# Patient Record
Sex: FEMALE | Race: WHITE | Employment: UNEMPLOYED | URBAN - NONMETROPOLITAN AREA
[De-identification: names, ages, dates, MRNs, and addresses within clinical notes are randomized per-mention and may not be internally consistent; named-entity substitution may affect disease eponyms.]

---

## 2017-03-27 ENCOUNTER — HISTORY (OUTPATIENT)
Dept: EMERGENCY MEDICINE | Facility: OTHER | Age: 62
End: 2017-03-27

## 2017-03-27 ENCOUNTER — AMBULATORY - GICH (OUTPATIENT)
Dept: SCHEDULING | Facility: OTHER | Age: 62
End: 2017-03-27

## 2017-03-29 ENCOUNTER — AMBULATORY - GICH (OUTPATIENT)
Dept: SCHEDULING | Facility: OTHER | Age: 62
End: 2017-03-29

## 2017-04-05 ENCOUNTER — COMMUNICATION - GICH (OUTPATIENT)
Dept: FAMILY MEDICINE | Facility: OTHER | Age: 62
End: 2017-04-05

## 2017-04-06 ENCOUNTER — OFFICE VISIT - GICH (OUTPATIENT)
Dept: FAMILY MEDICINE | Facility: OTHER | Age: 62
End: 2017-04-06

## 2017-04-06 ENCOUNTER — HISTORY (OUTPATIENT)
Dept: FAMILY MEDICINE | Facility: OTHER | Age: 62
End: 2017-04-06

## 2017-04-06 DIAGNOSIS — R07.9 CHEST PAIN: ICD-10-CM

## 2017-04-11 ENCOUNTER — AMBULATORY - GICH (OUTPATIENT)
Dept: RADIOLOGY | Facility: OTHER | Age: 62
End: 2017-04-11

## 2017-04-11 DIAGNOSIS — R94.6 ABNORMAL RESULTS OF THYROID FUNCTION STUDIES: ICD-10-CM

## 2017-04-18 ENCOUNTER — HOSPITAL ENCOUNTER (OUTPATIENT)
Dept: RADIOLOGY | Facility: OTHER | Age: 62
End: 2017-04-18

## 2017-04-18 DIAGNOSIS — R94.6 ABNORMAL RESULTS OF THYROID FUNCTION STUDIES: ICD-10-CM

## 2017-05-25 ENCOUNTER — AMBULATORY - GICH (OUTPATIENT)
Dept: RADIOLOGY | Facility: OTHER | Age: 62
End: 2017-05-25

## 2017-05-25 DIAGNOSIS — E04.2 NONTOXIC MULTINODULAR GOITER: ICD-10-CM

## 2017-05-25 DIAGNOSIS — E05.90 THYROTOXICOSIS WITHOUT THYROID STORM: ICD-10-CM

## 2017-05-30 ENCOUNTER — HOSPITAL ENCOUNTER (OUTPATIENT)
Dept: RADIOLOGY | Facility: OTHER | Age: 62
End: 2017-05-30

## 2017-05-30 DIAGNOSIS — E04.2 NONTOXIC MULTINODULAR GOITER: ICD-10-CM

## 2017-05-30 DIAGNOSIS — E05.90 THYROTOXICOSIS WITHOUT THYROID STORM: ICD-10-CM

## 2017-05-31 ENCOUNTER — HOSPITAL ENCOUNTER (OUTPATIENT)
Dept: RADIOLOGY | Facility: OTHER | Age: 62
End: 2017-05-31

## 2017-06-13 ENCOUNTER — HISTORY (OUTPATIENT)
Dept: EMERGENCY MEDICINE | Facility: OTHER | Age: 62
End: 2017-06-13

## 2017-06-13 ENCOUNTER — COMMUNICATION - GICH (OUTPATIENT)
Dept: FAMILY MEDICINE | Facility: OTHER | Age: 62
End: 2017-06-13

## 2017-06-21 ENCOUNTER — HISTORY (OUTPATIENT)
Dept: FAMILY MEDICINE | Facility: OTHER | Age: 62
End: 2017-06-21

## 2017-06-21 ENCOUNTER — OFFICE VISIT - GICH (OUTPATIENT)
Dept: FAMILY MEDICINE | Facility: OTHER | Age: 62
End: 2017-06-21

## 2017-06-21 DIAGNOSIS — R26.89 OTHER ABNORMALITIES OF GAIT AND MOBILITY: ICD-10-CM

## 2017-06-21 DIAGNOSIS — I27.29 OTHER SECONDARY PULMONARY HYPERTENSION (H): ICD-10-CM

## 2017-06-21 DIAGNOSIS — I34.0 NONRHEUMATIC MITRAL VALVE INSUFFICIENCY: ICD-10-CM

## 2017-06-21 ASSESSMENT — PATIENT HEALTH QUESTIONNAIRE - PHQ9: SUM OF ALL RESPONSES TO PHQ QUESTIONS 1-9: 0

## 2017-07-07 ENCOUNTER — COMMUNICATION - GICH (OUTPATIENT)
Dept: FAMILY MEDICINE | Facility: OTHER | Age: 62
End: 2017-07-07

## 2017-07-07 DIAGNOSIS — I10 ESSENTIAL (PRIMARY) HYPERTENSION: ICD-10-CM

## 2017-07-25 ENCOUNTER — MEDICAL CORRESPONDENCE (OUTPATIENT)
Dept: CARDIOLOGY | Facility: CLINIC | Age: 62
End: 2017-07-25
Payer: COMMERCIAL

## 2017-07-25 ENCOUNTER — TRANSFERRED RECORDS (OUTPATIENT)
Dept: HEALTH INFORMATION MANAGEMENT | Facility: CLINIC | Age: 62
End: 2017-07-25

## 2017-07-25 ENCOUNTER — HISTORY (OUTPATIENT)
Dept: CARDIOLOGY | Facility: OTHER | Age: 62
End: 2017-07-25

## 2017-07-25 ENCOUNTER — OFFICE VISIT - GICH (OUTPATIENT)
Dept: CARDIOLOGY | Facility: OTHER | Age: 62
End: 2017-07-25

## 2017-07-25 DIAGNOSIS — G47.33 OBSTRUCTIVE SLEEP APNEA: ICD-10-CM

## 2017-07-25 DIAGNOSIS — I50.32 CHRONIC DIASTOLIC HEART FAILURE (H): ICD-10-CM

## 2017-07-25 DIAGNOSIS — I34.0 NONRHEUMATIC MITRAL VALVE INSUFFICIENCY: ICD-10-CM

## 2017-07-25 DIAGNOSIS — I48.0 PAROXYSMAL ATRIAL FIBRILLATION (H): ICD-10-CM

## 2017-07-25 DIAGNOSIS — E78.00 PURE HYPERCHOLESTEROLEMIA: ICD-10-CM

## 2017-07-25 DIAGNOSIS — I27.29 OTHER SECONDARY PULMONARY HYPERTENSION (H): ICD-10-CM

## 2017-07-25 PROCEDURE — 99204 OFFICE O/P NEW MOD 45 MIN: CPT | Mod: ZP | Performed by: NURSE PRACTITIONER

## 2017-07-26 ENCOUNTER — COMMUNICATION - GICH (OUTPATIENT)
Dept: CARDIOLOGY | Facility: OTHER | Age: 62
End: 2017-07-26

## 2017-07-26 DIAGNOSIS — E78.5 HYPERLIPIDEMIA: ICD-10-CM

## 2017-07-27 ENCOUNTER — COMMUNICATION - GICH (OUTPATIENT)
Dept: CARDIOLOGY | Facility: OTHER | Age: 62
End: 2017-07-27

## 2017-07-27 DIAGNOSIS — I50.30 DIASTOLIC CONGESTIVE HEART FAILURE (H): ICD-10-CM

## 2017-07-27 DIAGNOSIS — I27.29 OTHER SECONDARY PULMONARY HYPERTENSION (H): ICD-10-CM

## 2017-08-08 ENCOUNTER — AMBULATORY - GICH (OUTPATIENT)
Dept: CARDIOLOGY | Facility: OTHER | Age: 62
End: 2017-08-08

## 2017-08-08 ENCOUNTER — AMBULATORY - GICH (OUTPATIENT)
Dept: LAB | Facility: OTHER | Age: 62
End: 2017-08-08

## 2017-08-08 DIAGNOSIS — E78.00 PURE HYPERCHOLESTEROLEMIA: ICD-10-CM

## 2017-08-08 LAB
CHOL/HDL RATIO - HISTORICAL: 3.25
CHOLESTEROL TOTAL: 208 MG/DL
HDLC SERPL-MCNC: 64 MG/DL (ref 23–92)
LDLC SERPL CALC-MCNC: 107 MG/DL
NON-HDL CHOLESTEROL - HISTORICAL: 144 MG/DL
PATIENT STATUS - HISTORICAL: ABNORMAL
TRIGL SERPL-MCNC: 183 MG/DL

## 2017-08-10 ENCOUNTER — COMMUNICATION - GICH (OUTPATIENT)
Dept: FAMILY MEDICINE | Facility: OTHER | Age: 62
End: 2017-08-10

## 2017-08-10 ENCOUNTER — AMBULATORY - GICH (OUTPATIENT)
Dept: SCHEDULING | Facility: OTHER | Age: 62
End: 2017-08-10

## 2017-08-22 ENCOUNTER — COMMUNICATION - GICH (OUTPATIENT)
Dept: FAMILY MEDICINE | Facility: OTHER | Age: 62
End: 2017-08-22

## 2017-08-22 DIAGNOSIS — I27.29 OTHER SECONDARY PULMONARY HYPERTENSION (H): ICD-10-CM

## 2017-08-25 ENCOUNTER — COMMUNICATION - GICH (OUTPATIENT)
Dept: FAMILY MEDICINE | Facility: OTHER | Age: 62
End: 2017-08-25

## 2017-08-25 ENCOUNTER — AMBULATORY - GICH (OUTPATIENT)
Dept: SCHEDULING | Facility: OTHER | Age: 62
End: 2017-08-25

## 2017-08-29 ENCOUNTER — MEDICAL CORRESPONDENCE (OUTPATIENT)
Dept: CARDIOLOGY | Facility: CLINIC | Age: 62
End: 2017-08-29
Payer: COMMERCIAL

## 2017-08-29 ENCOUNTER — HOSPITAL ENCOUNTER (OUTPATIENT)
Dept: RADIOLOGY | Facility: OTHER | Age: 62
End: 2017-08-29
Attending: NURSE PRACTITIONER

## 2017-08-29 ENCOUNTER — OFFICE VISIT - GICH (OUTPATIENT)
Dept: FAMILY MEDICINE | Facility: OTHER | Age: 62
End: 2017-08-29

## 2017-08-29 ENCOUNTER — HISTORY (OUTPATIENT)
Dept: FAMILY MEDICINE | Facility: OTHER | Age: 62
End: 2017-08-29

## 2017-08-29 ENCOUNTER — HISTORY (OUTPATIENT)
Dept: CARDIOLOGY | Facility: OTHER | Age: 62
End: 2017-08-29

## 2017-08-29 ENCOUNTER — OFFICE VISIT - GICH (OUTPATIENT)
Dept: CARDIOLOGY | Facility: OTHER | Age: 62
End: 2017-08-29

## 2017-08-29 DIAGNOSIS — I50.32 CHRONIC DIASTOLIC HEART FAILURE (H): ICD-10-CM

## 2017-08-29 DIAGNOSIS — I48.0 PAROXYSMAL ATRIAL FIBRILLATION (H): ICD-10-CM

## 2017-08-29 DIAGNOSIS — I27.29 OTHER SECONDARY PULMONARY HYPERTENSION (H): ICD-10-CM

## 2017-08-29 DIAGNOSIS — S93.509A: ICD-10-CM

## 2017-08-29 DIAGNOSIS — I10 ESSENTIAL (PRIMARY) HYPERTENSION: ICD-10-CM

## 2017-08-29 DIAGNOSIS — I34.0 NONRHEUMATIC MITRAL VALVE INSUFFICIENCY: ICD-10-CM

## 2017-08-29 DIAGNOSIS — R52 PAIN: ICD-10-CM

## 2017-08-29 PROCEDURE — 99214 OFFICE O/P EST MOD 30 MIN: CPT | Mod: ZP | Performed by: NURSE PRACTITIONER

## 2017-09-01 ENCOUNTER — OFFICE VISIT - GICH (OUTPATIENT)
Dept: FAMILY MEDICINE | Facility: OTHER | Age: 62
End: 2017-09-01

## 2017-09-01 ENCOUNTER — HISTORY (OUTPATIENT)
Dept: FAMILY MEDICINE | Facility: OTHER | Age: 62
End: 2017-09-01

## 2017-09-01 DIAGNOSIS — K80.10 CALCULUS OF GALLBLADDER WITH CHRONIC CHOLECYSTITIS WITHOUT OBSTRUCTION: ICD-10-CM

## 2017-09-01 ASSESSMENT — PATIENT HEALTH QUESTIONNAIRE - PHQ9: SUM OF ALL RESPONSES TO PHQ QUESTIONS 1-9: 0

## 2017-09-05 ENCOUNTER — HISTORY (OUTPATIENT)
Dept: SURGERY | Facility: OTHER | Age: 62
End: 2017-09-05

## 2017-09-05 ENCOUNTER — OFFICE VISIT - GICH (OUTPATIENT)
Dept: SURGERY | Facility: OTHER | Age: 62
End: 2017-09-05

## 2017-09-05 DIAGNOSIS — K80.10 CALCULUS OF GALLBLADDER WITH CHRONIC CHOLECYSTITIS WITHOUT OBSTRUCTION: ICD-10-CM

## 2017-09-05 LAB
A/G RATIO - HISTORICAL: 1.5 (ref 1–2)
ABSOLUTE BASOPHILS - HISTORICAL: 0 THOU/CU MM
ABSOLUTE EOSINOPHILS - HISTORICAL: 0.3 THOU/CU MM
ABSOLUTE IMMATURE GRANULOCYTES(METAS,MYELOS,PROS) - HISTORICAL: 0 THOU/CU MM
ABSOLUTE LYMPHOCYTES - HISTORICAL: 2.3 THOU/CU MM (ref 0.9–2.9)
ABSOLUTE MONOCYTES - HISTORICAL: 0.7 THOU/CU MM
ABSOLUTE NEUTROPHILS - HISTORICAL: 2.8 THOU/CU MM (ref 1.7–7)
ALBUMIN SERPL-MCNC: 3.7 G/DL (ref 3.5–5.7)
ALP SERPL-CCNC: 126 IU/L (ref 34–104)
ALT (SGPT) - HISTORICAL: 68 IU/L (ref 7–52)
AST SERPL-CCNC: 41 IU/L (ref 13–39)
BASOPHILS # BLD AUTO: 0.5 %
BILIRUB SERPL-MCNC: 0.6 MG/DL (ref 0.3–1)
BILIRUBIN, DIRECT: 0.13 MG/DL (ref 0.03–0.18)
BILIRUBIN,INDIRECT: 0.5 MG/DL (ref 0.2–0.8)
EOSINOPHIL NFR BLD AUTO: 5 %
ERYTHROCYTE [DISTWIDTH] IN BLOOD BY AUTOMATED COUNT: 13.4 % (ref 11.5–15.5)
GLOBULIN - HISTORICAL: 2.5 G/DL (ref 2–3.7)
HCT VFR BLD AUTO: 43.6 % (ref 33–51)
HEMOGLOBIN: 14.2 G/DL (ref 12–16)
IMMATURE GRANULOCYTES(METAS,MYELOS,PROS) - HISTORICAL: 0.2 %
LIPASE SERPL-CCNC: 8.7 IU/L (ref 11–82)
LYMPHOCYTES NFR BLD AUTO: 37.2 % (ref 20–44)
MCH RBC QN AUTO: 27.4 PG (ref 26–34)
MCHC RBC AUTO-ENTMCNC: 32.6 G/DL (ref 32–36)
MCV RBC AUTO: 84 FL (ref 80–100)
MONOCYTES NFR BLD AUTO: 11.2 %
NEUTROPHILS NFR BLD AUTO: 45.9 % (ref 42–72)
PLATELET # BLD AUTO: 237 THOU/CU MM (ref 140–440)
PMV BLD: 9.6 FL (ref 6.5–11)
PROT SERPL-MCNC: 6.2 G/DL (ref 6.4–8.9)
RED BLOOD COUNT - HISTORICAL: 5.19 MIL/CU MM (ref 4–5.2)
WHITE BLOOD COUNT - HISTORICAL: 6.2 THOU/CU MM (ref 4.5–11)

## 2017-09-13 ENCOUNTER — HOSPITAL ENCOUNTER (OUTPATIENT)
Dept: RADIOLOGY | Facility: OTHER | Age: 62
End: 2017-09-13
Attending: SURGERY

## 2017-09-13 DIAGNOSIS — K80.10 CALCULUS OF GALLBLADDER WITH CHRONIC CHOLECYSTITIS WITHOUT OBSTRUCTION: ICD-10-CM

## 2017-09-18 ENCOUNTER — COMMUNICATION - GICH (OUTPATIENT)
Dept: FAMILY MEDICINE | Facility: OTHER | Age: 62
End: 2017-09-18

## 2017-09-18 DIAGNOSIS — I10 ESSENTIAL (PRIMARY) HYPERTENSION: ICD-10-CM

## 2017-09-19 ENCOUNTER — COMMUNICATION - GICH (OUTPATIENT)
Dept: CARDIOLOGY | Facility: OTHER | Age: 62
End: 2017-09-19

## 2017-09-19 ENCOUNTER — HISTORY (OUTPATIENT)
Dept: SURGERY | Facility: OTHER | Age: 62
End: 2017-09-19

## 2017-09-19 ENCOUNTER — OFFICE VISIT - GICH (OUTPATIENT)
Dept: SURGERY | Facility: OTHER | Age: 62
End: 2017-09-19

## 2017-09-19 DIAGNOSIS — K80.20 CALCULUS OF GALLBLADDER WITHOUT CHOLECYSTITIS WITHOUT OBSTRUCTION: ICD-10-CM

## 2017-09-19 DIAGNOSIS — I48.0 PAROXYSMAL ATRIAL FIBRILLATION (H): ICD-10-CM

## 2017-09-19 LAB
A/G RATIO - HISTORICAL: 1.8 (ref 1–2)
ALBUMIN SERPL-MCNC: 3.6 G/DL (ref 3.5–5.7)
ALP SERPL-CCNC: 101 IU/L (ref 34–104)
ALT (SGPT) - HISTORICAL: 26 IU/L (ref 7–52)
AST SERPL-CCNC: 24 IU/L (ref 13–39)
BILIRUB SERPL-MCNC: 0.5 MG/DL (ref 0.3–1)
BILIRUBIN, DIRECT: 0.12 MG/DL (ref 0.03–0.18)
BILIRUBIN,INDIRECT: 0.4 MG/DL (ref 0.2–0.8)
GLOBULIN - HISTORICAL: 2 G/DL (ref 2–3.7)
PROT SERPL-MCNC: 5.6 G/DL (ref 6.4–8.9)

## 2017-09-20 ENCOUNTER — COMMUNICATION - GICH (OUTPATIENT)
Dept: FAMILY MEDICINE | Facility: OTHER | Age: 62
End: 2017-09-20

## 2017-09-25 ENCOUNTER — COMMUNICATION - GICH (OUTPATIENT)
Dept: CARDIOLOGY | Facility: OTHER | Age: 62
End: 2017-09-25

## 2017-09-25 DIAGNOSIS — I48.0 PAROXYSMAL ATRIAL FIBRILLATION (H): ICD-10-CM

## 2017-10-10 ENCOUNTER — COMMUNICATION - GICH (OUTPATIENT)
Dept: FAMILY MEDICINE | Facility: OTHER | Age: 62
End: 2017-10-10

## 2017-10-18 ENCOUNTER — AMBULATORY - GICH (OUTPATIENT)
Dept: SCHEDULING | Facility: OTHER | Age: 62
End: 2017-10-18

## 2017-12-07 ENCOUNTER — HISTORY (OUTPATIENT)
Dept: EMERGENCY MEDICINE | Facility: OTHER | Age: 62
End: 2017-12-07

## 2017-12-07 ENCOUNTER — HOSPITAL ENCOUNTER (OUTPATIENT)
Dept: MEDSURG UNIT | Facility: OTHER | Age: 62
Setting detail: OBSERVATION
Discharge: HOME | End: 2017-12-09
Admitting: SURGERY

## 2017-12-07 DIAGNOSIS — I50.32 CHRONIC DIASTOLIC HEART FAILURE (H): ICD-10-CM

## 2017-12-07 DIAGNOSIS — K81.0 ACUTE CHOLECYSTITIS: ICD-10-CM

## 2017-12-07 DIAGNOSIS — K80.12 CALCULUS OF GALLBLADDER WITH ACUTE ON CHRONIC CHOLECYSTITIS WITHOUT OBSTRUCTION: ICD-10-CM

## 2017-12-07 DIAGNOSIS — I48.0 PAROXYSMAL ATRIAL FIBRILLATION (H): ICD-10-CM

## 2017-12-07 DIAGNOSIS — K80.20 CALCULUS OF GALLBLADDER WITHOUT CHOLECYSTITIS WITHOUT OBSTRUCTION: ICD-10-CM

## 2017-12-07 DIAGNOSIS — I34.0 NONRHEUMATIC MITRAL VALVE INSUFFICIENCY: ICD-10-CM

## 2017-12-07 DIAGNOSIS — I27.20 PULMONARY HYPERTENSION (H): ICD-10-CM

## 2017-12-07 DIAGNOSIS — K21.9 GASTRO-ESOPHAGEAL REFLUX DISEASE WITHOUT ESOPHAGITIS: ICD-10-CM

## 2017-12-07 LAB
A/G RATIO - HISTORICAL: 1.4 (ref 1–2)
ABSOLUTE BASOPHILS - HISTORICAL: 0 THOU/CU MM
ABSOLUTE EOSINOPHILS - HISTORICAL: 0.2 THOU/CU MM
ABSOLUTE IMMATURE GRANULOCYTES(METAS,MYELOS,PROS) - HISTORICAL: 0 THOU/CU MM
ABSOLUTE LYMPHOCYTES - HISTORICAL: 2 THOU/CU MM (ref 0.9–2.9)
ABSOLUTE MONOCYTES - HISTORICAL: 0.7 THOU/CU MM
ABSOLUTE NEUTROPHILS - HISTORICAL: 5.5 THOU/CU MM (ref 1.7–7)
ALBUMIN SERPL-MCNC: 3.6 G/DL (ref 3.5–5.7)
ALP SERPL-CCNC: 87 IU/L (ref 34–104)
ALT (SGPT) - HISTORICAL: 22 IU/L (ref 7–52)
ANION GAP - HISTORICAL: 13 (ref 5–18)
AST SERPL-CCNC: 21 IU/L (ref 13–39)
BASOPHILS # BLD AUTO: 0.4 %
BILIRUB SERPL-MCNC: 0.3 MG/DL (ref 0.3–1)
BUN SERPL-MCNC: 17 MG/DL (ref 7–25)
BUN/CREAT RATIO - HISTORICAL: 35
CALCIUM SERPL-MCNC: 9.3 MG/DL (ref 8.6–10.3)
CHLORIDE SERPLBLD-SCNC: 105 MMOL/L (ref 98–107)
CO2 SERPL-SCNC: 25 MMOL/L (ref 21–31)
CREAT SERPL-MCNC: 0.49 MG/DL (ref 0.7–1.3)
EOSINOPHIL NFR BLD AUTO: 2.4 %
ERYTHROCYTE [DISTWIDTH] IN BLOOD BY AUTOMATED COUNT: 14 % (ref 11.5–15.5)
GFR IF NOT AFRICAN AMERICAN - HISTORICAL: >60 ML/MIN/1.73M2
GLOBULIN - HISTORICAL: 2.6 G/DL (ref 2–3.7)
GLUCOSE SERPL-MCNC: 155 MG/DL (ref 70–105)
HCT VFR BLD AUTO: 43 % (ref 33–51)
HEMOGLOBIN: 14.1 G/DL (ref 12–16)
IMMATURE GRANULOCYTES(METAS,MYELOS,PROS) - HISTORICAL: 0.2 %
LIPASE SERPL-CCNC: 15.5 IU/L (ref 11–82)
LYMPHOCYTES NFR BLD AUTO: 24 % (ref 20–44)
MCH RBC QN AUTO: 27.9 PG (ref 26–34)
MCHC RBC AUTO-ENTMCNC: 32.8 G/DL (ref 32–36)
MCV RBC AUTO: 85 FL (ref 80–100)
MONOCYTES NFR BLD AUTO: 8.4 %
NEUTROPHILS NFR BLD AUTO: 64.6 % (ref 42–72)
PLATELET # BLD AUTO: 231 THOU/CU MM (ref 140–440)
PMV BLD: 9.2 FL (ref 6.5–11)
POTASSIUM SERPL-SCNC: 4 MMOL/L (ref 3.5–5.1)
PROT SERPL-MCNC: 6.2 G/DL (ref 6.4–8.9)
RED BLOOD COUNT - HISTORICAL: 5.05 MIL/CU MM (ref 4–5.2)
SODIUM SERPL-SCNC: 143 MMOL/L (ref 133–143)
WHITE BLOOD COUNT - HISTORICAL: 8.5 THOU/CU MM (ref 4.5–11)

## 2017-12-08 ENCOUNTER — HISTORY (OUTPATIENT)
Dept: MEDSURG UNIT | Facility: OTHER | Age: 62
End: 2017-12-08

## 2017-12-08 LAB
A/G RATIO - HISTORICAL: 2.4 (ref 1–2)
ALBUMIN SERPL-MCNC: 3.4 G/DL (ref 3.5–5.7)
ALP SERPL-CCNC: 78 IU/L (ref 34–104)
ALT (SGPT) - HISTORICAL: 19 IU/L (ref 7–52)
ANION GAP - HISTORICAL: 8 (ref 5–18)
AST SERPL-CCNC: 19 IU/L (ref 13–39)
BILIRUB SERPL-MCNC: 0.3 MG/DL (ref 0.3–1)
BUN SERPL-MCNC: 12 MG/DL (ref 7–25)
BUN/CREAT RATIO - HISTORICAL: 30
CALCIUM SERPL-MCNC: 9 MG/DL (ref 8.6–10.3)
CHLORIDE SERPLBLD-SCNC: 106 MMOL/L (ref 98–107)
CO2 SERPL-SCNC: 27 MMOL/L (ref 21–31)
CREAT SERPL-MCNC: 0.4 MG/DL (ref 0.7–1.3)
ERYTHROCYTE [DISTWIDTH] IN BLOOD BY AUTOMATED COUNT: 14 % (ref 11.5–15.5)
GFR IF NOT AFRICAN AMERICAN - HISTORICAL: >60 ML/MIN/1.73M2
GLOBULIN - HISTORICAL: 1.4 G/DL (ref 2–3.7)
GLUCOSE SERPL-MCNC: 149 MG/DL (ref 70–105)
HCT VFR BLD AUTO: 38.5 % (ref 33–51)
HEMOGLOBIN: 12.5 G/DL (ref 12–16)
INR - HISTORICAL: 1
MAGNESIUM SERPL-MCNC: 2 MG/DL (ref 1.9–2.7)
MAGNESIUM SERPL-MCNC: 2.1 MG/DL (ref 1.9–2.7)
MCH RBC QN AUTO: 27.7 PG (ref 26–34)
MCHC RBC AUTO-ENTMCNC: 32.5 G/DL (ref 32–36)
MCV RBC AUTO: 85 FL (ref 80–100)
PLATELET # BLD AUTO: 215 THOU/CU MM (ref 140–440)
PMV BLD: 9.4 FL (ref 6.5–11)
POTASSIUM SERPL-SCNC: 4 MMOL/L (ref 3.5–5.1)
PROT SERPL-MCNC: 4.8 G/DL (ref 6.4–8.9)
PROTIME - HISTORICAL: 12.2 SEC (ref 11.9–15.2)
RED BLOOD COUNT - HISTORICAL: 4.51 MIL/CU MM (ref 4–5.2)
SODIUM SERPL-SCNC: 141 MMOL/L (ref 133–143)
WHITE BLOOD COUNT - HISTORICAL: 7.6 THOU/CU MM (ref 4.5–11)

## 2017-12-11 ENCOUNTER — SURGERY (OUTPATIENT)
Dept: SURGERY | Facility: OTHER | Age: 62
End: 2017-12-11

## 2017-12-12 ENCOUNTER — HISTORY (OUTPATIENT)
Dept: INTENSIVE CARE | Facility: OTHER | Age: 62
End: 2017-12-12

## 2017-12-21 ENCOUNTER — HISTORY (OUTPATIENT)
Dept: FAMILY MEDICINE | Facility: OTHER | Age: 62
End: 2017-12-21

## 2017-12-21 ENCOUNTER — OFFICE VISIT - GICH (OUTPATIENT)
Dept: FAMILY MEDICINE | Facility: OTHER | Age: 62
End: 2017-12-21

## 2017-12-21 DIAGNOSIS — G89.18 OTHER ACUTE POSTPROCEDURAL PAIN: ICD-10-CM

## 2017-12-21 DIAGNOSIS — G47.30 SLEEP APNEA: ICD-10-CM

## 2017-12-21 DIAGNOSIS — I48.0 PAROXYSMAL ATRIAL FIBRILLATION (H): ICD-10-CM

## 2017-12-27 ENCOUNTER — OFFICE VISIT - GICH (OUTPATIENT)
Dept: SURGERY | Facility: OTHER | Age: 62
End: 2017-12-27

## 2017-12-27 ENCOUNTER — HISTORY (OUTPATIENT)
Dept: SURGERY | Facility: OTHER | Age: 62
End: 2017-12-27

## 2017-12-27 DIAGNOSIS — Z90.49 ACQUIRED ABSENCE OF OTHER SPECIFIED PARTS OF DIGESTIVE TRACT: ICD-10-CM

## 2017-12-27 NOTE — PROGRESS NOTES
Patient Information     Patient Name MRN Sex Jewels Valerio [id number] Female 1955      Progress Notes by Ivone York MD at 2017  1:50 PM     Author:  Ivone York MD Service:  (none) Author Type:  Physician     Filed:  2017 12:40 PM Encounter Date:  2017 Status:  Signed     :  Ivone York MD (Physician)            OFFICE CONSULTATION NOTE  Patient Name: Jewels Ferrer  Address: [address] [place] [id number]  Age:61 y.o.  Sex: female     Primary Care Physician: Brock Browne MD    I was requested to see this patient in consultation by Brock Browne MD for evaluation of gall stones. A copy of this note will be sent to Brock Browne MD.    HPI:   The patient is 61 y.o. female with episodes of chest and stomach pain without nausea and vomiting. These have been going on for about a year. The patient was seen in the emergency department and a CT scan was done showing gallstones. A dairy free diet was recommended. The patient has been avoiding dairy and the pain has been less. She hasn't had any pain with eating lasagne, bananas and soups including cream soup. When the pain was the worst it radiated from her chest into her stomach. She denies right upper quadrant pain. Patient denies diarrhea or light colored stools. Patient hasn't had any dark urine. Patient denies fevers. She hasn't had an US. Her lipase and AST were slightly elevated on the emergency department visit, Alkaline phosphatase, ALT, bilirubin and White blood cell count were normal.    CONSULTATION ASSESSMENT AND PLAN/RECOMMENDATIONS:   I discussed with the patient the pathophysiology of gallbladder disease and gallstones with the patient. I recommend an US for further evaluation of the gall bladder and a recheck of lipase and liver chemistry tests. She will follow up with me after the US. I explained that with her medical problems it is important that we evaluate her fully before deciding to do  a surgery. The patient denies questions at this time. She will call if she has severe pain, dark urine, light stools, fever or diarrhea.      REVIEW OF SYSTEMS  GENERAL: No fevers or chills. Denies fatigue, recent weight loss.  HEENT: No sinus drainage. No changes with vision or hearing. No difficulty swallowing.   LYMPHATICS:  No swollen nodes in axilla, neck or groin.  CARDIOVASCULAR: Denies chest pain, palpitations and dyspnea on exertion.  PULMONARY: No shortness of breath or cough. No increase in sputum production.  GI: Denies melena, bright red blood in stools. No hematemesis. No constipation or diarrhea.  : No dysuria or hematuria.  SKIN: No recent rashes or ulcers.   HEMATOLOGY:  No history of easy bruising or bleeding.  ENDOCRINE:  No history of diabetes or thyroid problems.  NEUROLOGY:  No history of seizures or headaches. No motor or sensory changes.    PAST MEDICAL HISTORY  Past Medical History:     Diagnosis  Date     Chest pain 8/3/2004    normal sestamibi cardiac scan      Chondral defect of femoral condyle 8/22/2014     Medial meniscus tear 8/8/2014     S/P arthroscopic knee surgery 9/8/2014      PAST SURGICAL HISTORY  Past Surgical History:      Procedure  Laterality Date     APPENDECTOMY  2/1995     collar bone dislocate      surgery       COLONOSCOPY SCREENING  4/2006    Normal screening. follow-up recommended in 10 years       DILATION AND CURETTAGE      for endometritis       KNEE ARTHROSCOPY  8/23/2007     left knee by Dr. Garcia       S/P KNEE ARTHROSCOPY Left 9/8/2014      CURRENT MEDS  Current Outpatient Prescriptions on File Prior to Visit       Medication  Sig Dispense Refill     acetaminophen (TYLENOL EXTRA STRENGTH) 500 mg tablet Take 1 tablet by mouth every 6 hours if needed. Max acetaminophen dose: 4000mg in 24 hrs.  0     atorvastatin (LIPITOR) 10 mg tablet Take 1 tablet by mouth at bedtime. 60 tablet 3     furosemide (LASIX) 20 mg tablet Take 1 tablet by mouth every morning. 90  tablet 3     HYDROcodone-acetaminophen, 5-325 mg, (NORCO) per tablet Take 1-2 tablets by mouth every 4 hours if needed  for Pain Max acetaminophen dose: 4000mg in 24 hrs. 15 tablet 0     lisinopril (PRINIVIL; ZESTRIL) 5 mg tablet Take 1 tablet by mouth once daily. 90 tablet 2     metoprolol tartrate (LOPRESSOR) 25 mg tablet TAKE 1 TABLET BY MOUTH TWICE DAILY 180 tablet 3     omeprazole (PRILOSEC) 40 mg Delayed-Release capsule Take 1 capsule by mouth once daily. 90 capsule 4     rivaroxaban (XARELTO) 20 mg tablet Take 20 mg by mouth once daily.       No current facility-administered medications on file prior to visit.      ALLERGIES/SENSITIVITIES  Allergies      Allergen   Reactions     Meclomen [Meclofenamate Sodium]  *Unknown     Naproxen  Nausea And Vomiting     Sulfa (Sulfonamide Antibiotics)  Rash     vomiting      FAMILY HISTORY  Family History       Problem   Relation Age of Onset     Other  Mother      Bronchiectasis/Chronic headaches       Hypertension  Father      Diabetes  Father      Heart Disease  Father      MI       Other  Sister      Chronic headaches        SOCIAL HISTORY  Social History     Social History        Marital status:       Spouse name: N/A     Number of children:  N/A     Years of education:  N/A     Occupational History      Not on file.     Social History Main Topics          Smoking status:   Former Smoker      Quit date:  8/28/2004      Smokeless tobacco:   Never Used      Alcohol use   Yes      Comment: occ       Drug use:   No      Sexual activity:   Not on file      Other Topics  Concern     Not on file      Social History Narrative     Does not smoke      3 boys.  paper route                  The above history was reviewed 9/5/2017.  PHYSICAL EXAM  /72  Pulse 72  Wt 59.4 kg (131 lb)  Breastfeeding? No  BMI 23.21 kg/m2  GENERAL: Healthy appearing patient in no acute distress. Pleasant and cooperative with exam and interview.   HEENT: Head-normocephalic.  Eyes-no scleral icterus, pupils equal, round, and reactive to light. Nose-no nasal drainage. No lesions. Mouth-oral mucosa pink and moist, no lesions.  NECK: Supple. No thyroid nodules. Trachea midline.  LYMPHATICS:  No cervical, axillary or supraclavicular adenopathy.  CV: Regular rate and rhythm, no murmurs. No peripheral edema.  LUNGS:  No respiratory distress. Clear bilaterally to auscultation.  ABDOMEN: Non distended. Bowel sounds active. Soft, non-tender, no hepatosplenomegaly or hernias. No peritoneal signs.  SKIN: Pink, warm and dry. No jaundice. No rash.  NEURO:  Cranial nerves II-XII grossly intact. Alert and oriented.  PSYCH: Appropriate mood and affect.      IMAGING/LAB  I personally reviewed patient's recent labs from the emergency department and CT scan.    Ivone York MD

## 2017-12-27 NOTE — PROGRESS NOTES
Patient Information     Patient Name MRN Sex Jewels Valerio [id number] Female 1955      Progress Notes by Brock Browne MD at 2017  2:15 PM     Author:  Brock Browne MD Service:  (none) Author Type:  Physician     Filed:  2017  9:29 AM Encounter Date:  2017 Status:  Signed     :  Brock Browne MD (Physician)            SUBJECTIVE:    Jewels Ferrer is a 61 y.o. female who presents for follow-up    HPI Comments: Patient arrives here for a hospital follow-up. She was recently at [facility] for chest pain shortness of breath. She did have an angiogram which was normal but the discharge summary indicates severe mitral regurgitation. She states that she is supposed to follow-up with cardiology but has not ever gotten an appointment. Is wanting to follow-up here. I do not have the [facility] reports available for review. Patient is also wanting a handicap application. She reports that she has trouble with her balance. Trouble falling. She either uses another person or cane. She continues to drive.        Allergies      Allergen   Reactions     Meclomen [Meclofenamate Sodium]  *Unknown     Naproxen  Nausea And Vomiting     Sulfa (Sulfonamide Antibiotics)  Rash     vomiting    ,   Family History       Problem   Relation Age of Onset     Other  Mother      Bronchiectasis/Chronic headaches       Hypertension  Father      Diabetes  Father      Heart Disease  Father      MI       Other  Sister      Chronic headaches     ,   Current Outpatient Prescriptions on File Prior to Visit       Medication  Sig Dispense Refill     acetaminophen (TYLENOL EXTRA STRENGTH) 500 mg tablet Take 1 tablet by mouth every 6 hours if needed. Max acetaminophen dose: 4000mg in 24 hrs.  0     metoprolol tartrate (LOPRESSOR) 25 mg tablet Take 25 mg by mouth 2 times daily.       omeprazole (PRILOSEC) 40 mg Delayed-Release capsule Take 1 capsule by mouth once daily. 90 capsule 4     rivaroxaban (XARELTO) 20 mg  tablet Take 20 mg by mouth once daily.       No current facility-administered medications on file prior to visit.    ,   Past Surgical History:      Procedure  Laterality Date     APPENDECTOMY  2/1995     collar bone dislocate      surgery       COLONOSCOPY SCREENING  4/2006    Normal screening. follow-up recommended in 10 years       DILATION AND CURETTAGE      for endometritis       KNEE ARTHROSCOPY  8/23/2007     left knee by Dr. Garcia       S/P KNEE ARTHROSCOPY Left 9/8/2014   ,   Social History        Substance Use Topics          Smoking status:   Former Smoker      Quit date:  8/28/2004      Smokeless tobacco:   Never Used      Alcohol use   Yes      Comment: occ      and   Social History        Substance Use Topics          Smoking status:   Former Smoker      Quit date:  8/28/2004      Smokeless tobacco:   Never Used      Alcohol use   Yes      Comment: occ         REVIEW OF SYSTEMS:  ROS    OBJECTIVE:  /64  Pulse 64  Wt 61.6 kg (135 lb 12.8 oz)  BMI 24.06 kg/m2    EXAM:   Physical Exam   Constitutional: She is well-developed, well-nourished, and in no distress.   HENT:   Head: Normocephalic and atraumatic.   Cardiovascular: Normal rate and regular rhythm.    Pulmonary/Chest: Effort normal and breath sounds normal.   Musculoskeletal:   Patient does walk with a shuffle.   Neurological: She is alert.   Psychiatric: Affect normal.       ASSESSMENT/PLAN:    ICD-10-CM    1. Pulmonary hypertension (HC) I27.2 lisinopril (PRINIVIL; ZESTRIL) 5 mg tablet      OT PRE DRIVING SCREEN      AMB CONSULT TO CARDIOLOGY   2. Non-rheumatic mitral regurgitation I34.0    3. Balance disorder R26.89         Plan:  Cardiology consult set up here for her mitral regurgitation. Discussed with nursing staff.  She appears to be a candidate for a handicap certification. Some clear whether she is qualified to drive a motor vehicle. Recommended that we need to proceed with OT driving screen prior to the handicap certificate.  Patient is agreeable. 25-30 minutes spent with the patient greater than 50% counseling and coordinating care.

## 2017-12-28 NOTE — PROGRESS NOTES
Patient Information     Patient Name MRN Sex Jewels Valerio [id number] Female 1955      Progress Notes by Dina Whiteside NP at 2017  2:45 PM     Author:  Dina Whiteside NP Service:  (none) Author Type:  PHYS- Nurse Practitioner     Filed:  2017  8:12 PM Encounter Date:  2017 Status:  Signed     :  Dina Whiteside NP (PHYS- Nurse Practitioner)            HPI:  Nursing Notes:   Bonnie Keller  2017  2:58 PM  Signed  Patient presents with left great toe pain. Patient stubbed it on end table this morning. Bonnie Keller LPN .............2017  2:45 PM      Jewels Ferrer is a 61 y.o. female who presents to clinic today for stubbed first toe on left foot into end table today. Pretty painful, swollen, bruised, can't bend, can't bear weight. Iced briefly.     Past Medical History:     Diagnosis  Date     Chest pain 8/3/2004    normal sestamibi cardiac scan      Chondral defect of femoral condyle 2014     Medial meniscus tear 2014     S/P arthroscopic knee surgery 2014     Past Surgical History:      Procedure  Laterality Date     APPENDECTOMY  1995     collar bone dislocate      surgery       COLONOSCOPY SCREENING  2006    Normal screening. follow-up recommended in 10 years       DILATION AND CURETTAGE      for endometritis       KNEE ARTHROSCOPY  2007     left knee by Dr. Garcia       S/P KNEE ARTHROSCOPY Left 2014     Social History        Substance Use Topics          Smoking status:   Former Smoker      Quit date:  2004      Smokeless tobacco:   Never Used      Alcohol use   Yes      Comment: occ       Current Outpatient Prescriptions       Medication  Sig Dispense Refill     acetaminophen (TYLENOL EXTRA STRENGTH) 500 mg tablet Take 1 tablet by mouth every 6 hours if needed. Max acetaminophen dose: 4000mg in 24 hrs.  0     atorvastatin (LIPITOR) 10 mg tablet Take 1 tablet by mouth at bedtime. 60  tablet 3     furosemide (LASIX) 20 mg tablet Take 1 tablet by mouth every morning. 90 tablet 3     HYDROcodone-acetaminophen, 5-325 mg, (NORCO) per tablet Take 1-2 tablets by mouth every 4 hours if needed  for Pain Max acetaminophen dose: 4000mg in 24 hrs. 15 tablet 0     lisinopril (PRINIVIL; ZESTRIL) 5 mg tablet Take 1 tablet by mouth once daily. 90 tablet 2     metoprolol tartrate (LOPRESSOR) 25 mg tablet TAKE 1 TABLET BY MOUTH TWICE DAILY 180 tablet 3     omeprazole (PRILOSEC) 40 mg Delayed-Release capsule Take 1 capsule by mouth once daily. 90 capsule 4     rivaroxaban (XARELTO) 20 mg tablet Take 20 mg by mouth once daily.       No current facility-administered medications for this visit.      Medications have been reviewed by me and are current to the best of my knowledge and ability.    Allergies      Allergen   Reactions     Meclomen [Meclofenamate Sodium]  *Unknown     Naproxen  Nausea And Vomiting     Sulfa (Sulfonamide Antibiotics)  Rash     vomiting        ROS:  Refer to HPI    /70  Pulse 55  Temp 95.4  F (35.2  C) (Tympanic)   Breastfeeding? No    EXAM:  General Appearance: poorly kept female, appropriate appearance for age. No acute distress  Musculoskeletal: limited flexion and extension great toe left foot, decreased strength  Dermatological: bruising and swelling great toe left foot  Psychological: drowsy, alert and pleasant    ASSESSMENT/PLAN:    ICD-10-CM    1. Toe sprain, initial encounter S93.509A SHOE HARD SOLE   2. Pain R52 XR TOES 3 VIEWS LEFT   Pain, swelling and bruising big toe left foot  On exam:  limited flexion and extension great toe left foot, decreased strength, bruising and swelling great toe left foot  Xray obtained and reviewed-no evidence of fracture  Diagnosis: Toe Sprain  Treat with-   Ice and elevate toe for first 48 hours  Tylenol/Ibuprofen to decrease pain and swelling  Hard soled shoe to protect toe  Limit time spent on feet  Please follow up with PCP if symptoms  worsen or don't improve      Ice and elevate toe for first 48 hours  Tylenol/Ibuprofen to decrease pain and swelling  Hard soled shoe to protect toe  Limit time spent on feet  Please follow up with PCP if symptoms worsen or don't improve    Patient Instructions   Ice and elevate toe for first 48 hours  Tylenol/Ibuprofen to decrease pain and swelling  Hard soled shoe to protect toe  Limit time spent on feet

## 2017-12-28 NOTE — PATIENT INSTRUCTIONS
Patient Information     Patient Name MRN Sex Jewels Valerio [id number] Female 1955      Patient Instructions by Chelsea Chandra RN at 2017 12:45 PM     Author:  Chelsea Chandra RN Service:  (none) Author Type:  NURS- Registered Nurse     Filed:  2017  2:07 PM Encounter Date:  2017 Status:  Signed     :  Chelsea Chandra RN (NURS- Registered Nurse)              1.  Start lasix 20 mg, once per day in the morning.      2.  Fasting labs in 2 weeks    3.  Please follow-up with Nica Amezcua NP in 1 month    4.  [facility] for mitral valve repair - we will be contact with you about scheduling this

## 2017-12-28 NOTE — PROGRESS NOTES
Patient Information     Patient Name MRN Sex Jewels Valerio [id number] Female 1955      Progress Notes by Nica Amezcua NP at 2017 12:45 PM     Author:  Nica Amezcua NP Service:  (none) Author Type:  PHYS- Nurse Practitioner     Filed:  2017  9:23 AM Encounter Date:  2017 Status:  Signed     :  Nica Amezcua NP (PHYS- Nurse Practitioner)            [facility] HEART CARE   CARDIOLOGY CONSULT    Jewels Ferrer  [address] [address]    Brock Browne MD    Chief Complaint     Patient presents with       Consult      consult Pulmonary HTN         HPI:  Jewels Ferrer is a 61 year old female who presents to the cardiology clinic to establish care. She has a history of mitral valve regurgitation, hypertension, grade III diastolic dysfunction, paroxysmal atrial fibrillation, hypercholesterolemia, mild PH, hypothyroidism and obstructive sleep apnea for which she is currently not treating.    She has been seen in the ER on multiple occasions for recurrent chest pain, which was deemed to be atypical chest pains up until 1717 in which she was transferred to [place] for an NSTEMI. She had a left heart catheter which was negative for any underlying CAD/ obstructing disease. It was concerning of abnormal left ventricular function, there was suspicion for stress cardiomyopathy and she was started on ACE inhibitor and beta blocker at that time. Later during her hospital stay she was noted to have paroxysmal atrial fibrillation and was also started on Xarelto prior to discharge. On echocardiogram she was incidentally found to have severe mitral regurgitation and she was scheduled to come back for further evaluation with a MARTHA on an outpatient basis. She also had a CTA of her chest to evaluate for PE, which was also negative. On further workup of her atrial fibrillation she was found to have a low TSH level and normal T3 and T4. For her hyperthyroidism she  has been following Dr. Sam at [facility] in [place]. At her follow-up visit for MARTHA her mitral regurgitation was found to be more moderate than severe, she was also seen by Dr. Palacios through [facility] structural heart program/ valve clinic and was recommended a six-month follow-up at that time as she has remained asymptomatic.    Currently she is denying any chest pain/ discomfort or shortness of breath. She does admit to increased edema in her lower extremities which she has noted over the past 3 weeks. No palpitations. No lightheadedness or syncope.       IMAGING RESULTS:  Echocardiogram on 3/28/17 at [facility]:  The left ventricle is normal size, quantified left ventricular ejection fraction of 57%. Left ventricular diastolic function is severely abnormal, grade III restrictive filling. Estimated filling pressures are elevated. There is severe mitral regurgitation by PISA method and by pulmonary vein systolic flow reversal. Multiple mitral regurgitant jets. There is borderline mitral valve prolapse. Mitral valve leaflets appear thickened. The left atrium is severely enlarged by volume. Inferior vena cava size normal and collapsibility less than 50% indicating mildly elevated right atrial pressure, 8 mmHg. Mild tricuspid regurgitation. Calculated RV systolic pressure was estimated at 45 mmHg. There is mild pulmonary hypertension.    CT angiogram chest on 3/27/17:  There are multiple thyroid nodules no hilar or mediastinal adenopathy is evident. There are no pulmonary emboli. No evidence of aortic dissection is evident. Examination of the lung fields demonstrate minimal atelectasis in the left lower lung. The upper abdominal images show cortical cysts on the right kidney. Cardiac silhouette is prominent. No evidence of pulmonary embolus. Minimal atelectasis left lower lung.      PAST MEDICAL HISTORY:  Past Medical History:     Diagnosis  Date     Chest pain 8/3/2004    normal sestamibi cardiac  scan      Chondral defect of femoral condyle 8/22/2014     Medial meniscus tear 8/8/2014     S/P arthroscopic knee surgery 9/8/2014       FAMILY HISTORY:  Family History       Problem   Relation Age of Onset     Other  Mother      Bronchiectasis/Chronic headaches       Hypertension  Father      Diabetes  Father      Heart Disease  Father      MI       Other  Sister      Chronic headaches         PAST SURGICAL HISTORY:  Past Surgical History:      Procedure  Laterality Date     APPENDECTOMY  2/1995     collar bone dislocate      surgery       COLONOSCOPY SCREENING  4/2006    Normal screening. follow-up recommended in 10 years       DILATION AND CURETTAGE      for endometritis       KNEE ARTHROSCOPY  8/23/2007     left knee by Dr. Garcia       S/P KNEE ARTHROSCOPY Left 9/8/2014       SOCIAL HISTORY:  Social History     Social History        Marital status:       Spouse name: N/A     Number of children:  N/A     Years of education:  N/A     Social History Main Topics          Smoking status:   Former Smoker      Quit date:  8/28/2004      Smokeless tobacco:   Never Used      Alcohol use   Yes      Comment: occ       Drug use:   No      Sexual activity:   Not Asked      Other Topics  Concern     None      Social History Narrative     Does not smoke      3 boys.  paper route                   CURRENT MEDICATIONS:  Current Outpatient Prescriptions on File Prior to Visit       Medication  Sig Dispense Refill     acetaminophen (TYLENOL EXTRA STRENGTH) 500 mg tablet Take 1 tablet by mouth every 6 hours if needed. Max acetaminophen dose: 4000mg in 24 hrs.  0     lisinopril (PRINIVIL; ZESTRIL) 5 mg tablet Take 1 tablet by mouth once daily. 90 tablet 2     omeprazole (PRILOSEC) 40 mg Delayed-Release capsule Take 1 capsule by mouth once daily. 90 capsule 4     rivaroxaban (XARELTO) 20 mg tablet Take 20 mg by mouth once daily.       No current facility-administered medications on file prior to visit.         ALLERGIES:  Allergies      Allergen   Reactions     Meclomen [Meclofenamate Sodium]  *Unknown     Naproxen  Nausea And Vomiting     Sulfa (Sulfonamide Antibiotics)  Rash     vomiting          ROS:  CONSTITUTIONAL:  No weight loss, fever, chills, weakness or fatigue.  CARDIOVASCULAR:  No chest pain, chest pressure or chest discomfort. No palpitations. Positive for mild lower extremity edema.  RESPIRATORY:  No shortness of breath, dyspnea upon exertion, cough or sputum production.  GASTROINTESTINAL: No abdominal pain. No anorexia, nausea, vomiting or diarrhea.   NEUROLOGICAL:  No headache, lightheadedness, dizziness, syncope, ataxia or weakness.  HEMATOLOGIC:  No anemia, bleeding or bruising.  ENDOCRINOLOGIC:  No reports of sweating, cold or heat intolerance. No polyuria or polydipsia.  SKIN:  No abnormal rashes or itching.      PHYSICAL EXAM:  /78  Pulse 56  Wt 61.8 kg (136 lb 3.2 oz)  BMI 24.13 kg/m2  GENERAL: The patient is a well-developed, well-nourished, in no apparent distress. Alert and oriented x3.  HEENT: Head is normocephalic and atraumatic. Eyes are symmetrical with normal visual tracking. Nares appeared normal without nasal drainage. Mucous membranes are moist.   NECK: Supple. No evidence JVD.   HEART: Regular rate and rhythm, S1S2 present without murmur, rub or gallop.  LUNGS: Respirations regular and unlabored. Clear to auscultation.  EXTREMITIES: 1+ nonpitting peripheral edema present.   NEUROLOGIC: Alert and oriented X3. No focal neurologic deficits.   SKIN: No jaundice. No rashes or visible skin lesions present.    EKG:  Sinus bradycardia rate 55 bpm, left axis deviation, LVH    LAB RESULTS:  Admission on 06/13/2017, Discharged on 06/13/2017        Component  Date Value Ref Range Status     APTT 06/13/2017 35  26 - 39 sec Final     SODIUM 06/13/2017 138  133 - 143 mmol/L Final     POTASSIUM 06/13/2017 4.0  3.5 - 5.1 mmol/L Final     CHLORIDE 06/13/2017 104  98 - 107 mmol/L Final      CO2,TOTAL 06/13/2017 27  21 - 31 mmol/L Final     ANION GAP 06/13/2017 7  5 - 18                 Final     GLUCOSE 06/13/2017 143* 70 - 105 mg/dL Final     CALCIUM 06/13/2017 9.8  8.6 - 10.3 mg/dL Final     BUN 06/13/2017 16  7 - 25 mg/dL Final     CREATININE 06/13/2017 0.40* 0.70 - 1.30 mg/dL Final     BUN/CREAT RATIO           06/13/2017 40                  Final     GFR if  06/13/2017 >60  >60 ml/min/1.73m2 Final     GFR if not  06/13/2017 >60  >60 ml/min/1.73m2 Final     ALBUMIN 06/13/2017 3.4* 3.5 - 5.7 g/dL Final     PROTEIN,TOTAL 06/13/2017 5.6* 6.4 - 8.9 g/dL Final     GLOBULIN                  06/13/2017 2.2  2.0 - 3.7 g/dL Final     A/G RATIO 06/13/2017 1.5  1.0 - 2.0                 Final     BILIRUBIN,TOTAL 06/13/2017 0.3  0.3 - 1.0 mg/dL Final     ALK PHOSPHATASE 06/13/2017 77  34 - 104 IU/L Final     ALT (SGPT) 06/13/2017 16  7 - 52 IU/L Final     AST (SGOT) 06/13/2017 17  13 - 39 IU/L Final     INR 06/13/2017 1.4* <1.3 Final     PROTIME 06/13/2017 14.3  11.9 - 15.2 sec Final     TROPONIN I 06/13/2017 <0.030  <0.034 ng/mL Final     D-DIMER, QUANTITATIVE  06/13/2017 379* >199 - 230 ng/mL Final     TROPONIN I 06/13/2017 <0.030  <0.034 ng/mL Final     WHITE BLOOD COUNT         06/13/2017 9.0  4.5 - 11.0 thou/cu mm Final     RED BLOOD COUNT           06/13/2017 4.67  4.00 - 5.20 mil/cu mm Final     HEMOGLOBIN                06/13/2017 13.3  12.0 - 16.0 g/dL Final     HEMATOCRIT                06/13/2017 39.2  33.0 - 51.0 % Final     MCV                       06/13/2017 84  80 - 100 fL Final     MCH                       06/13/2017 28.5  26.0 - 34.0 pg Final     MCHC                      06/13/2017 33.9  32.0 - 36.0 g/dL Final     RDW                       06/13/2017 12.9  11.5 - 15.5 % Final     PLATELET COUNT            06/13/2017 217  140 - 440 thou/cu mm Final     MPV                       06/13/2017 9.5  6.5 - 11.0 fL Final     NEUTROPHILS               06/13/2017 51.5   42.0 - 72.0 % Final     LYMPHOCYTES               06/13/2017 34.0  20.0 - 44.0 % Final     MONOCYTES                 06/13/2017 11.1  <12.0 % Final     EOSINOPHILS               06/13/2017 3.0  <8.0 % Final     BASOPHILS                 06/13/2017 0.2  <3.0 % Final     ABSOLUTE NEUTROPHILS      06/13/2017 4.6  1.7 - 7.0 thou/cu mm Final     ABSOLUTE LYMPHOCYTES      06/13/2017 3.1* 0.9 - 2.9 thou/cu mm Final     ABSOLUTE MONOCYTES        06/13/2017 1.0* <0.9 thou/cu mm Final     ABSOLUTE EOSINOPHILS      06/13/2017 0.3  <0.5 thou/cu mm Final     ABSOLUTE BASOPHILS        06/13/2017 0.0  <0.3 thou/cu mm Final   Admission on 03/27/2017, Discharged on 03/27/2017        Component  Date Value Ref Range Status     INR 03/27/2017 1.0  <1.3 Final     PROTIME 03/27/2017 10.3* 11.9 - 15.2 sec Final     SODIUM 03/27/2017 143  133 - 143 mmol/L Final     POTASSIUM 03/27/2017 3.7  3.5 - 5.1 mmol/L Final     CHLORIDE 03/27/2017 106  98 - 107 mmol/L Final     CO2,TOTAL 03/27/2017 26  21 - 31 mmol/L Final     ANION GAP 03/27/2017 11  5 - 18                 Final     GLUCOSE 03/27/2017 132* 70 - 105 mg/dL Final     CALCIUM 03/27/2017 9.7  8.6 - 10.3 mg/dL Final     BUN 03/27/2017 14  7 - 25 mg/dL Final     CREATININE 03/27/2017 0.52* 0.70 - 1.30 mg/dL Final     BUN/CREAT RATIO           03/27/2017 27                  Final     GFR if  03/27/2017 >60  >60 ml/min/1.73m2 Final     GFR if not  03/27/2017 >60  >60 ml/min/1.73m2 Final     ALBUMIN 03/27/2017 3.9  3.5 - 5.7 g/dL Final     PROTEIN,TOTAL 03/27/2017 6.6  6.4 - 8.9 g/dL Final     GLOBULIN                  03/27/2017 2.7  2.0 - 3.7 g/dL Final     A/G RATIO 03/27/2017 1.4  1.0 - 2.0                 Final     BILIRUBIN,TOTAL 03/27/2017 0.4  0.3 - 1.0 mg/dL Final     ALK PHOSPHATASE 03/27/2017 82  34 - 104 IU/L Final     ALT (SGPT) 03/27/2017 22  7 - 52 IU/L Final     AST (SGOT) 03/27/2017 20  13 - 39 IU/L Final     LIPASE 03/27/2017 11.9  11.0 - 82.0  IU/L Final     TROPONIN I 03/27/2017 <0.030  <0.034 ng/mL Final     WHITE BLOOD COUNT         03/27/2017 9.8  4.5 - 11.0 thou/cu mm Final     RED BLOOD COUNT           03/27/2017 5.19  4.00 - 5.20 mil/cu mm Final     HEMOGLOBIN                03/27/2017 14.9  12.0 - 16.0 g/dL Final     HEMATOCRIT                03/27/2017 44.4  33.0 - 51.0 % Final     MCV                       03/27/2017 85  80 - 100 fL Final     MCH                       03/27/2017 28.7  26.0 - 34.0 pg Final     MCHC                      03/27/2017 33.6  32.0 - 36.0 g/dL Final     RDW                       03/27/2017 13.1  11.5 - 15.5 % Final     PLATELET COUNT            03/27/2017 295  140 - 440 thou/cu mm Final     MPV                       03/27/2017 6.5  6.5 - 11.0 fL Final     NEUTROPHILS               03/27/2017 36.5* 42.0 - 72.0 % Final     LYMPHOCYTES               03/27/2017 44.5* 20.0 - 44.0 % Final     MONOCYTES                 03/27/2017 13.5* <12.0 % Final     EOSINOPHILS               03/27/2017 3.9  <8.0 % Final     BASOPHILS                 03/27/2017 1.6  <3.0 % Final     ABSOLUTE NEUTROPHILS      03/27/2017 3.6  1.7 - 7.0 thou/cu mm Final     ABSOLUTE LYMPHOCYTES      03/27/2017 4.4* 0.9 - 2.9 thou/cu mm Final     ABSOLUTE MONOCYTES        03/27/2017 1.3* <0.9 thou/cu mm Final     ABSOLUTE EOSINOPHILS      03/27/2017 0.4  <0.5 thou/cu mm Final     ABSOLUTE BASOPHILS        03/27/2017 0.2  <0.3 thou/cu mm Final     TROPONIN I 03/27/2017 0.112* <0.034 ng/mL Final         ASSESSMENT:  Jewels Ferrer presents to establish cardiac care. She has a history of moderate-severe mitral regurgitation, hypertension, grade III diastolic dysfunction, paroxysmal atrial fibrillation, hypercholesterolemia, mild PH, hypothyroidism and obstructive sleep apnea.       PLAN:  1. Pulmonary hypertension (HC)  documented mild pulmonary hypertension on most recent echocardiogram, this has remained stable.       2. Non-rheumatic mitral  regurgitation  moderate to severe mitral regurgitation, initially evaluated on echocardiogram and further evaluation with MARTHA, which resulted in MR being in the more moderate range. Ultimately she had been asymptomatic with this. She was seen at the [facility] valve clinic by Dr. Palacios and it was recommended she follow up in 6 months. Over the past 3 weeks she has noticed increased edema in her lower extremities. I contacted the [facility] valve clinic regarding these symptoms with her significant MR. she would prefer to go to [facility] for treatment of her mitral regurgitation. It is verified that they will fit her in early August for follow-up/ discuss treatment/ mitral clip.    3. HYPERCHOLESTEROLEMIA  she is due for a fasting lipid panel. She is not currently on a statin. No CAD history.    4. Obstructive sleep apnea syndrome  she has been diagnosed with obstructive sleep apnea, she refuses to use CPAP. Discussed being seen again to evaluate for a better fitting mask and she is not interested at the current time.    5. Chronic diastolic heart failure (HC)  chronic grade III diastolic heart failure. She will continue on her beta blocker and ACE inhibitor. We will begin Lasix 20 mg daily. Plan to establish care with chronic heart failure clinic.    6. Paroxysmal atrial fibrillation (HC)  history paroxysmal atrial fibrillation, she will continue her beta blocker and her anticoagulant Xarelto. She is currently not symptomatic with this.       Patient will follow-up in the valve clinic as scheduled this early August. It is recommended she have fasting labs in 2 weeks. We would like to see her back in one to 2 months, certainly sooner as needed.    Thank you for allowing me to participate in the care of your patient. Please do not hesitate to contact me if you have any questions.     GRANT Roberts, CFELSY  Cardiology

## 2017-12-28 NOTE — TELEPHONE ENCOUNTER
Patient Information     Patient Name MRN Jewels Ferguson [id number] Female 1955      Telephone Encounter by Jessica Roberson at 2017  1:39 PM     Author:  Jessica Roberson Service:  (none) Author Type:  (none)     Filed:  2017  1:40 PM Encounter Date:  2017 Status:  Signed     :  Jessica Roberson            Cardiology not here until 17.  Can you address this?  Jessica Roberson LPN ....................  2017   1:39 PM

## 2017-12-28 NOTE — ADDENDUM NOTE
Patient Information     Patient Name MRN Sex Jewels Vlaerio [id number] Female 1955      Addendum Note by Gosselin, Norma J at 2017  3:07 PM     Author:  Gosselin, Norma J Service:  (none) Author Type:  (none)     Filed:  2017  3:07 PM Encounter Date:  2017 Status:  Signed     :  Gosselin, Norma J       Addended by: GOSSELIN, NORMA J on: 2017 03:07 PM        Modules accepted: Orders

## 2017-12-28 NOTE — PROGRESS NOTES
Patient Information     Patient Name MRN Sex Jewels Valerio [id number] Female 1955      Progress Notes by Nica Amezcua NP at 2017  1:45 PM     Author:  Nica Amezcua NP Service:  (none) Author Type:  PHYS- Nurse Practitioner     Filed:  2017  3:14 PM Encounter Date:  2017 Status:  Signed     :  Nica Amezcua NP (PHYS- Nurse Practitioner)            [facility]   CARDIOLOGY PROGRESS NOTE    Jewels Ferrer  [address] [facility] [address]    Brock Browne MD    Chief Complaint     Patient presents with       Follow Up      Dr visit in [place]         HPI:  Jewels Ferrer is a 61 year old female who presents for cardiology follow-up. She has a history of mitral valve regurgitation, hypertension, grade III diastolic dysfunction, paroxysmal atrial fibrillation, hypercholesterolemia, mild PH, hypothyroidism and obstructive sleep apnea for which she is currently not treating.    On 2017 she was transferred to [facility] and [place] for a NSTEMI. Left heart catheter was negative for any underlying CAD obstructive disease. During his hospitalization she was found to have paroxysmal atrial fibrillation and was started on Xarelto and beta blocker. An echocardiogram she was incidentally found to have a severe mitral regurgitation in which she had a follow-up outpatient MARTHA which was found to be more moderate.    He was last seen by cardiology on 17 in which she reported increased edema and just not feeling well for the past 3 weeks. No chest pain/rash or or palpitations. No lightheadedness or syncope. No significant dyspnea on exertion or shortness of breath.    Due to her increased lower extremity edema and just not feeling well she was referred to the [facility] all valve/structural heart program in [place]. She did request to go here as it was closer than the [facility]. At that point her edema had improved and her transthoracic  echocardiogram was reviewed which was very encouraging with preserved LV function as well as a lesser degree of mitral regurgitation. It was felt at this point, there is no clinical indication for mitral valve repair, percutaneous or surgical. He was also felt that the degree of mitral regurgitation that she had in the past was likely related to an acute volume overload in the setting of tachycardia with her atrial fibrillation that is now improved with rate control and diuresis.    On an additional note, she was recently diagnosed with cholecystitis and will be seeing her PCP in the near future for Gen. surgery referral. She also stubbed her left toe today which is very painful to touch and difficult to walk on. There is significant bruising over her left toe, she will be seen in the rapid clinic following her visit today for x-rays related to this injury.        PAST MEDICAL HISTORY:  Past Medical History:     Diagnosis  Date     Chest pain 8/3/2004    normal sestamibi cardiac scan      Chondral defect of femoral condyle 8/22/2014     Medial meniscus tear 8/8/2014     S/P arthroscopic knee surgery 9/8/2014       FAMILY HISTORY:  Family History       Problem   Relation Age of Onset     Other  Mother      Bronchiectasis/Chronic headaches       Hypertension  Father      Diabetes  Father      Heart Disease  Father      MI       Other  Sister      Chronic headaches         PAST SURGICAL HISTORY:  Past Surgical History:      Procedure  Laterality Date     APPENDECTOMY  2/1995     collar bone dislocate      surgery       COLONOSCOPY SCREENING  4/2006    Normal screening. follow-up recommended in 10 years       DILATION AND CURETTAGE      for endometritis       KNEE ARTHROSCOPY  8/23/2007     left knee by Dr. Garcia       S/P KNEE ARTHROSCOPY Left 9/8/2014       SOCIAL HISTORY:  Social History     Social History        Marital status:       Spouse name: N/A     Number of children:  N/A     Years of education:  N/A      Social History Main Topics          Smoking status:   Former Smoker      Quit date:  8/28/2004      Smokeless tobacco:   Never Used      Alcohol use   Yes      Comment: occ       Drug use:   No      Sexual activity:   Not Asked      Other Topics  Concern     None      Social History Narrative     Does not smoke      3 boys.  paper route                   CURRENT MEDICATIONS:  Current Outpatient Prescriptions on File Prior to Visit       Medication  Sig Dispense Refill     acetaminophen (TYLENOL EXTRA STRENGTH) 500 mg tablet Take 1 tablet by mouth every 6 hours if needed. Max acetaminophen dose: 4000mg in 24 hrs.  0     atorvastatin (LIPITOR) 10 mg tablet Take 1 tablet by mouth at bedtime. 60 tablet 3     furosemide (LASIX) 20 mg tablet Take 1 tablet by mouth every morning. 90 tablet 3     HYDROcodone-acetaminophen, 5-325 mg, (NORCO) per tablet Take 1-2 tablets by mouth every 4 hours if needed  for Pain Max acetaminophen dose: 4000mg in 24 hrs. 15 tablet 0     lisinopril (PRINIVIL; ZESTRIL) 5 mg tablet Take 1 tablet by mouth once daily. 90 tablet 2     metoprolol tartrate (LOPRESSOR) 25 mg tablet TAKE 1 TABLET BY MOUTH TWICE DAILY 180 tablet 3     omeprazole (PRILOSEC) 40 mg Delayed-Release capsule Take 1 capsule by mouth once daily. 90 capsule 4     rivaroxaban (XARELTO) 20 mg tablet Take 20 mg by mouth once daily.       No current facility-administered medications on file prior to visit.        ALLERGIES:  Allergies      Allergen   Reactions     Meclomen [Meclofenamate Sodium]  *Unknown     Naproxen  Nausea And Vomiting     Sulfa (Sulfonamide Antibiotics)  Rash     vomiting          ROS:  CONSTITUTIONAL:  No weight loss, fever, chills, weakness or fatigue.  CARDIOVASCULAR:  No chest pain, chest pressure or chest discomfort. No palpitations. lower extremity edema and proved.  RESPIRATORY:  No shortness of breath, dyspnea upon exertion, cough or sputum production.  NEUROLOGICAL:  No headache,  lightheadedness, dizziness, syncope, ataxia or weakness.  HEMATOLOGIC:  No anemia, bleeding or bruising.  PSYCHIATRIC:  No history of depression or anxiety.  ENDOCRINOLOGIC:  No reports of sweating, cold or heat intolerance. No polyuria or polydipsia.  SKIN:  No abnormal rashes or itching.      PHYSICAL EXAM:  /62  Pulse 56  Wt 59.9 kg (132 lb)  BMI 23.38 kg/m2  GENERAL: The patient is a well-developed, well-nourished, in no apparent distress. Alert and oriented x3.  HEENT: Head is normocephalic and atraumatic. Eyes are symmetrical with normal visual tracking. Nares appeared normal without nasal drainage. Mucous membranes are moist.   NECK: Supple.   HEART: Regular rate and rhythm, S1S2 present without any murmur heard, no rub or gallop.  LUNGS: Respirations regular and unlabored. Clear to auscultation.  EXTREMITIES: No peripheral edema present.  NEUROLOGIC: Alert and oriented X3. No focal neurologic deficits.   SKIN: No jaundice. No rashes or visible skin lesions present.      LAB RESULTS:  Admission on 08/21/2017, Discharged on 08/21/2017        Component  Date Value Ref Range Status     APTT 08/21/2017 37  26 - 39 sec Final     SODIUM 08/21/2017 141  133 - 143 mmol/L Final     POTASSIUM 08/21/2017 4.0  3.5 - 5.1 mmol/L Final     CHLORIDE 08/21/2017 103  98 - 107 mmol/L Final     CO2,TOTAL 08/21/2017 26  21 - 31 mmol/L Final     ANION GAP 08/21/2017 12  5 - 18                 Final     GLUCOSE 08/21/2017 147* 70 - 105 mg/dL Final     CALCIUM 08/21/2017 10.0  8.6 - 10.3 mg/dL Final     BUN 08/21/2017 13  7 - 25 mg/dL Final     CREATININE 08/21/2017 0.48* 0.70 - 1.30 mg/dL Final     BUN/CREAT RATIO           08/21/2017 27                  Final     GFR if  08/21/2017 >60  >60 ml/min/1.73m2 Final     GFR if not  08/21/2017 >60  >60 ml/min/1.73m2 Final     ALBUMIN 08/21/2017 3.8  3.5 - 5.7 g/dL Final     PROTEIN,TOTAL 08/21/2017 6.2* 6.4 - 8.9 g/dL Final     GLOBULIN                   08/21/2017 2.4  2.0 - 3.7 g/dL Final     A/G RATIO 08/21/2017 1.6  1.0 - 2.0                 Final     BILIRUBIN,TOTAL 08/21/2017 0.9  0.3 - 1.0 mg/dL Final     ALK PHOSPHATASE 08/21/2017 100  34 - 104 IU/L Final     ALT (SGPT) 08/21/2017 37  7 - 52 IU/L Final     AST (SGOT) 08/21/2017 59* 13 - 39 IU/L Final     INR 08/21/2017 1.7* <1.3 Final     PROTIME 08/21/2017 21.0* 11.9 - 15.2 sec Final     TROPONIN I 08/21/2017 <0.030  <0.034 ng/mL Final     WHITE BLOOD COUNT         08/21/2017 8.5  4.5 - 11.0 thou/cu mm Final     RED BLOOD COUNT           08/21/2017 5.23* 4.00 - 5.20 mil/cu mm Final     HEMOGLOBIN                08/21/2017 14.3  12.0 - 16.0 g/dL Final     HEMATOCRIT                08/21/2017 43.5  33.0 - 51.0 % Final     MCV                       08/21/2017 83  80 - 100 fL Final     MCH                       08/21/2017 27.3  26.0 - 34.0 pg Final     MCHC                      08/21/2017 32.9  32.0 - 36.0 g/dL Final     RDW                       08/21/2017 13.5  11.5 - 15.5 % Final     PLATELET COUNT            08/21/2017 259  140 - 440 thou/cu mm Final     MPV                       08/21/2017 9.6  6.5 - 11.0 fL Final     NEUTROPHILS               08/21/2017 50.1  42.0 - 72.0 % Final     LYMPHOCYTES               08/21/2017 33.5  20.0 - 44.0 % Final     MONOCYTES                 08/21/2017 12.6* <12.0 % Final     EOSINOPHILS               08/21/2017 3.2  <8.0 % Final     BASOPHILS                 08/21/2017 0.4  <3.0 % Final     IMMATURE GRANULOCYTES(METAS,MYELOS* 08/21/2017 0.2  % Final     ABSOLUTE NEUTROPHILS      08/21/2017 4.2  1.7 - 7.0 thou/cu mm Final     ABSOLUTE LYMPHOCYTES      08/21/2017 2.8  0.9 - 2.9 thou/cu mm Final     ABSOLUTE MONOCYTES        08/21/2017 1.1* <0.9 thou/cu mm Final     ABSOLUTE EOSINOPHILS      08/21/2017 0.3  <0.5 thou/cu mm Final     ABSOLUTE BASOPHILS        08/21/2017 0.0  <0.3 thou/cu mm Final     ABSOLUTE IMMATURE GRANULOCYTES(MET* 08/21/2017 0.0  <=0.3 thou/cu mm  Final     LIPASE 08/21/2017 118.7* 11.0 - 82.0 IU/L Final     TROPONIN I 08/21/2017 <0.030  <0.034 ng/mL Final   Orders Only on 08/08/2017        Component  Date Value Ref Range Status     CHOLESTEROL,TOTAL 08/08/2017 208* <200 mg/dL Final     TRIGLYCERIDES 08/08/2017 183* <150 mg/dL Final     HDL CHOLESTEROL 08/08/2017 64  23 - 92 mg/dL Final     NON-HDL CHOLESTEROL 08/08/2017 144  <145 mg/dl Final     CHOL/HDL RATIO            08/08/2017 3.25  <4.50                 Final     LDL CHOLESTEROL 08/08/2017 107* <100 mg/dL Final     PATIENT STATUS            08/08/2017 FASTING                  Final   Admission on 06/13/2017, Discharged on 06/13/2017        Component  Date Value Ref Range Status     APTT 06/13/2017 35  26 - 39 sec Final     SODIUM 06/13/2017 138  133 - 143 mmol/L Final     POTASSIUM 06/13/2017 4.0  3.5 - 5.1 mmol/L Final     CHLORIDE 06/13/2017 104  98 - 107 mmol/L Final     CO2,TOTAL 06/13/2017 27  21 - 31 mmol/L Final     ANION GAP 06/13/2017 7  5 - 18                 Final     GLUCOSE 06/13/2017 143* 70 - 105 mg/dL Final     CALCIUM 06/13/2017 9.8  8.6 - 10.3 mg/dL Final     BUN 06/13/2017 16  7 - 25 mg/dL Final     CREATININE 06/13/2017 0.40* 0.70 - 1.30 mg/dL Final     BUN/CREAT RATIO           06/13/2017 40                  Final     GFR if  06/13/2017 >60  >60 ml/min/1.73m2 Final     GFR if not  06/13/2017 >60  >60 ml/min/1.73m2 Final     ALBUMIN 06/13/2017 3.4* 3.5 - 5.7 g/dL Final     PROTEIN,TOTAL 06/13/2017 5.6* 6.4 - 8.9 g/dL Final     GLOBULIN                  06/13/2017 2.2  2.0 - 3.7 g/dL Final     A/G RATIO 06/13/2017 1.5  1.0 - 2.0                 Final     BILIRUBIN,TOTAL 06/13/2017 0.3  0.3 - 1.0 mg/dL Final     ALK PHOSPHATASE 06/13/2017 77  34 - 104 IU/L Final     ALT (SGPT) 06/13/2017 16  7 - 52 IU/L Final     AST (SGOT) 06/13/2017 17  13 - 39 IU/L Final     INR 06/13/2017 1.4* <1.3 Final     PROTIME 06/13/2017 14.3  11.9 - 15.2 sec Final      TROPONIN I 06/13/2017 <0.030  <0.034 ng/mL Final     D-DIMER, QUANTITATIVE  06/13/2017 379* >199 - 230 ng/mL Final     TROPONIN I 06/13/2017 <0.030  <0.034 ng/mL Final     WHITE BLOOD COUNT         06/13/2017 9.0  4.5 - 11.0 thou/cu mm Final     RED BLOOD COUNT           06/13/2017 4.67  4.00 - 5.20 mil/cu mm Final     HEMOGLOBIN                06/13/2017 13.3  12.0 - 16.0 g/dL Final     HEMATOCRIT                06/13/2017 39.2  33.0 - 51.0 % Final     MCV                       06/13/2017 84  80 - 100 fL Final     MCH                       06/13/2017 28.5  26.0 - 34.0 pg Final     MCHC                      06/13/2017 33.9  32.0 - 36.0 g/dL Final     RDW                       06/13/2017 12.9  11.5 - 15.5 % Final     PLATELET COUNT            06/13/2017 217  140 - 440 thou/cu mm Final     MPV                       06/13/2017 9.5  6.5 - 11.0 fL Final     NEUTROPHILS               06/13/2017 51.5  42.0 - 72.0 % Final     LYMPHOCYTES               06/13/2017 34.0  20.0 - 44.0 % Final     MONOCYTES                 06/13/2017 11.1  <12.0 % Final     EOSINOPHILS               06/13/2017 3.0  <8.0 % Final     BASOPHILS                 06/13/2017 0.2  <3.0 % Final     ABSOLUTE NEUTROPHILS      06/13/2017 4.6  1.7 - 7.0 thou/cu mm Final     ABSOLUTE LYMPHOCYTES      06/13/2017 3.1* 0.9 - 2.9 thou/cu mm Final     ABSOLUTE MONOCYTES        06/13/2017 1.0* <0.9 thou/cu mm Final     ABSOLUTE EOSINOPHILS      06/13/2017 0.3  <0.5 thou/cu mm Final     ABSOLUTE BASOPHILS        06/13/2017 0.0  <0.3 thou/cu mm Final   Admission on 03/27/2017, Discharged on 03/27/2017        Component  Date Value Ref Range Status     INR 03/27/2017 1.0  <1.3 Final     PROTIME 03/27/2017 10.3* 11.9 - 15.2 sec Final     SODIUM 03/27/2017 143  133 - 143 mmol/L Final     POTASSIUM 03/27/2017 3.7  3.5 - 5.1 mmol/L Final     CHLORIDE 03/27/2017 106  98 - 107 mmol/L Final     CO2,TOTAL 03/27/2017 26  21 - 31 mmol/L Final     ANION GAP 03/27/2017 11  5 - 18                  Final     GLUCOSE 03/27/2017 132* 70 - 105 mg/dL Final     CALCIUM 03/27/2017 9.7  8.6 - 10.3 mg/dL Final     BUN 03/27/2017 14  7 - 25 mg/dL Final     CREATININE 03/27/2017 0.52* 0.70 - 1.30 mg/dL Final     BUN/CREAT RATIO           03/27/2017 27                  Final     GFR if  03/27/2017 >60  >60 ml/min/1.73m2 Final     GFR if not  03/27/2017 >60  >60 ml/min/1.73m2 Final     ALBUMIN 03/27/2017 3.9  3.5 - 5.7 g/dL Final     PROTEIN,TOTAL 03/27/2017 6.6  6.4 - 8.9 g/dL Final     GLOBULIN                  03/27/2017 2.7  2.0 - 3.7 g/dL Final     A/G RATIO 03/27/2017 1.4  1.0 - 2.0                 Final     BILIRUBIN,TOTAL 03/27/2017 0.4  0.3 - 1.0 mg/dL Final     ALK PHOSPHATASE 03/27/2017 82  34 - 104 IU/L Final     ALT (SGPT) 03/27/2017 22  7 - 52 IU/L Final     AST (SGOT) 03/27/2017 20  13 - 39 IU/L Final     LIPASE 03/27/2017 11.9  11.0 - 82.0 IU/L Final     TROPONIN I 03/27/2017 <0.030  <0.034 ng/mL Final     WHITE BLOOD COUNT         03/27/2017 9.8  4.5 - 11.0 thou/cu mm Final     RED BLOOD COUNT           03/27/2017 5.19  4.00 - 5.20 mil/cu mm Final     HEMOGLOBIN                03/27/2017 14.9  12.0 - 16.0 g/dL Final     HEMATOCRIT                03/27/2017 44.4  33.0 - 51.0 % Final     MCV                       03/27/2017 85  80 - 100 fL Final     MCH                       03/27/2017 28.7  26.0 - 34.0 pg Final     MCHC                      03/27/2017 33.6  32.0 - 36.0 g/dL Final     RDW                       03/27/2017 13.1  11.5 - 15.5 % Final     PLATELET COUNT            03/27/2017 295  140 - 440 thou/cu mm Final     MPV                       03/27/2017 6.5  6.5 - 11.0 fL Final     NEUTROPHILS               03/27/2017 36.5* 42.0 - 72.0 % Final     LYMPHOCYTES               03/27/2017 44.5* 20.0 - 44.0 % Final     MONOCYTES                 03/27/2017 13.5* <12.0 % Final     EOSINOPHILS               03/27/2017 3.9  <8.0 % Final     BASOPHILS                  03/27/2017 1.6  <3.0 % Final     ABSOLUTE NEUTROPHILS      03/27/2017 3.6  1.7 - 7.0 thou/cu mm Final     ABSOLUTE LYMPHOCYTES      03/27/2017 4.4* 0.9 - 2.9 thou/cu mm Final     ABSOLUTE MONOCYTES        03/27/2017 1.3* <0.9 thou/cu mm Final     ABSOLUTE EOSINOPHILS      03/27/2017 0.4  <0.5 thou/cu mm Final     ABSOLUTE BASOPHILS        03/27/2017 0.2  <0.3 thou/cu mm Final     TROPONIN I 03/27/2017 0.112* <0.034 ng/mL Final         ASSESSMENT:  Jewels Ferrer presents for follow-up mild mitral regurgitation, hypertension, paroxysmal atrial fibrillation, mild pulmonary hypertension and grade 3 diastolic dysfunction.      PLAN:  1. Chronic diastolic heart failure (HC)  chronic grade III diastolic heart failure. She will continue on her beta blocker and ACE inhibitor. Edema has improved since her last visit when Lasix was initiated. Labs have remained stable with medication adjustments. Follow-up HFpEF in one year, certainly sooner as needed.     2. HYPERTENSION, BENIGN  blood pressure controlled at 120/62 in clinic today. No adjustments needed.    3. Non-rheumatic mitral regurgitation  recently evaluated by Dr. Palacios at [facility]note 3] heart and vascular [place], structural heart disease program. It is felt that her mitral regurgitation has improved and is considered mild and asymptomatic at this time. Her transthoracic echocardiogram was encouraging with preserved LV function as well as with a lesser degree of mitral regurgitation noted. No clinical decompensation. It is suspected that the degree of mitral regurgitation that she previously had was likely related to an acute volume overload in the setting of tachycardia with her paroxysmal atrial fibrillation. Therefore, this is now improved with rate control and diuresis. We will repeat her echocardiogram in one year for continued surveillance.    4. Paroxysmal atrial fibrillation (HC)  history paroxysmal atrial fibrillation, she will continue her beta  blocker and her anticoagulant Xarelto. She is currently not symptomatic with this.     5. Pulmonary hypertension (HC)  documented mild pulmonary hypertension on most recent echocardiogram, no clinical decompensation. Continue on current dose of Lasix.      6. HYPERCHOLESTEROLEMIA  she had a fasting lipid panel since her last visit which is not at goal, Lipitor was started at 10 mg before bed and she has been doing well with this, no reported myalgias. She will continue this high intensity statin at the current dose and we will recheck her fasting lipid panel in one year.    I would like to see Mrs. Ferrer on an annual basis for chronic diastolic heart failure, mild mitral regurgitation, benign hypertension, paroxysmal atrial fibrillation, hyperlipidemia and mild pulmonary hypertension. From a cardiology standpoint she remains quite stable. Certainly she may be seen sooner as needed.      Thank you for allowing me to participate in the care of your patient. Please do not hesitate to contact me if you have any questions.     GRANT Roberts, CFNP  Cardiology

## 2017-12-28 NOTE — TELEPHONE ENCOUNTER
Patient Information     Patient Name MRN Jewels Ferguson [id number] Female 1955      Telephone Encounter by Chelsea Perez RN at 2017 11:06 AM     Author:  Chelsea Perez RN Service:  (none) Author Type:  NURS- Registered Nurse     Filed:  2017 11:08 AM Encounter Date:  2017 Status:  Signed     :  Chelsea Perez RN (NURS- Registered Nurse)            Redundant Refill Request refused.    Medication:metoprolol tartrate (LOPRESSOR) 25 mg tablet  Prescription #:[id number]    Qty:180 tablet  Ref:3  Start:2017   End:              Route:Oral                  MILE:No   Class:eRx    Sig:TAKE 1 TABLET BY MOUTH TWICE DAILY    Pharmacy:[facility] [address] [place] [address] AT [address]              [address] - [phone]    Unable to complete prescription refill per RN Medication Refill Policy.................... Chelsea Perez RN ....................  2017   11:07 AM

## 2017-12-28 NOTE — PROGRESS NOTES
Patient Information     Patient Name MRN Jewels Ferguson [id number] Female 1955      Progress Notes by Ivone York MD at 2017 10:20 AM     Author:  Ivone York MD Service:  (none) Author Type:  Physician     Filed:  2017  4:00 PM Encounter Date:  2017 Status:  Signed     :  Ivone York MD (Physician)            Primary Care Physician: Brock Browne MD    HPI:   Patient is here for follow up. The patient is 62 y.o. female with previous epigastric and chest pain. She had CT that showed gallstones. She has felt better since her last visit. She hasn't had pain. She has been doing ok with a low fat diet. She has been tolerating dairy. She had US done. She is concerned because the pharmacy wouldn't refill her Xarelto.       ASSESSMENT AND PLAN/RECOMMENDATIONS:   Gall stones-currently asymptomatic. With medical issues (anticoagualtion, severe OBSTRUCTIVE SLEEP APNEA) would recommend holding off on surgery unless her gall bladder is acting up. Discussed risks of surgery and of being off her anti-coagulation also discussed risks of recurrent right upper quadrant pain and pancreatitis. Patient in agreement with holding off on surgery. I spoke with cardiology nursing after reviewing most recent cardiology note. Recommendation for continuing Xarelto, they will refill. Patient will call with concerns otherwise will follow up AS NEEDED right upper quadrant pain, dark urine, light stools or other concerns.   Patient denies questions at this time    PAST MEDICAL HISTORY  Past Medical History:     Diagnosis  Date     Chest pain 8/3/2004    normal sestamibi cardiac scan      Chondral defect of femoral condyle 2014     Medial meniscus tear 2014     S/P arthroscopic knee surgery 2014     Severe obstructive sleep apnea 2015    doesn't use CPAP       PAST SURGICAL HISTORY  Past Surgical History:      Procedure  Laterality Date     APPENDECTOMY  1995     collar bone  dislocate      surgery       COLONOSCOPY SCREENING  4/2006    Normal screening. follow-up recommended in 10 years       DILATION AND CURETTAGE      for endometritis       KNEE ARTHROSCOPY  8/23/2007     left knee by Dr. Garcia       S/P KNEE ARTHROSCOPY Left 9/8/2014      CURRENT MEDS  Current Outpatient Prescriptions on File Prior to Visit       Medication  Sig Dispense Refill     acetaminophen (TYLENOL EXTRA STRENGTH) 500 mg tablet Take 1 tablet by mouth every 6 hours if needed. Max acetaminophen dose: 4000mg in 24 hrs.  0     atorvastatin (LIPITOR) 10 mg tablet Take 1 tablet by mouth at bedtime. 60 tablet 3     furosemide (LASIX) 20 mg tablet Take 1 tablet by mouth every morning. 90 tablet 3     HYDROcodone-acetaminophen, 5-325 mg, (NORCO) per tablet Take 1-2 tablets by mouth every 4 hours if needed  for Pain Max acetaminophen dose: 4000mg in 24 hrs. 15 tablet 0     lisinopril (PRINIVIL; ZESTRIL) 5 mg tablet Take 1 tablet by mouth once daily. 90 tablet 2     metoprolol tartrate (LOPRESSOR) 25 mg tablet TAKE 1 TABLET BY MOUTH TWICE DAILY 180 tablet 3     omeprazole (PRILOSEC) 40 mg Delayed-Release capsule Take 1 capsule by mouth once daily. 90 capsule 4     rivaroxaban (XARELTO) 20 mg tablet Take 20 mg by mouth once daily.       No current facility-administered medications on file prior to visit.      ALLERGIES/SENSITIVITIES  Allergies      Allergen   Reactions     Meclomen [Meclofenamate Sodium]  *Unknown     Naproxen  Nausea And Vomiting     Sulfa (Sulfonamide Antibiotics)  Rash     vomiting      REVIEW OF SYSTEMS  GENERAL: No fevers or chills. Denies fatigue, recent weight loss.  HEENT: No sinus drainage. No changes with vision or hearing. No difficulty swallowing.   LYMPHATICS:  No swollen nodes in axilla, neck or groin.  CARDIOVASCULAR: Denies chest pain, palpitations and dyspnea on exertion.  PULMONARY: No shortness of breath or cough. No increase in sputum production.  GI: Denies melena, bright red blood  in stools. No hematemesis. No constipation or diarrhea.  : No dysuria or hematuria.  SKIN: No recent rashes or ulcers.   HEMATOLOGY:  Has easy bruising and bleeding.  ENDOCRINE:  No history of diabetes or thyroid problems.  NEUROLOGY:  No history of seizures or headaches. No motor or sensory changes.    PHYSICAL EXAM  /70  Pulse 60  Wt 60.1 kg (132 lb 6.4 oz)  Breastfeeding? No  BMI 23.45 kg/m2  GENERAL: chronically ill appearing patient in no acute distress. Pleasant and cooperative with exam and interview.   HEENT: Head-normocephalic. Eyes-no scleral icterus. Nose-no nasal drainage. No lesions. Mouth-oral mucosa pink and moist, no lesions.  NECK: Supple. No thyroid nodules. Trachea midline.  LYMPHATICS:  No cervical, axillary or supraclavicular adenopathy.  CV: irregular rate and rhythm. No peripheral edema.  LUNGS:  No respiratory distress. Clear bilaterally to auscultation.  ABDOMEN: Non distended. Bowel sounds active. Soft, non-tender, no hepatosplenomegaly or hernias. No peritoneal signs.  SKIN: Pink, warm and dry. No jaundice. No rash.  NEURO:  Cranial nerves II-XII grossly intact. Alert and oriented.  PSYCH: Appropriate mood and flat affect.    IMAGING/LAB  I personally reviewed patient's previous labs and US images and report-+ stones, no pericholecystic fluid or common bile duct dilation.  Reviewed LFT today-normal AST, ALT and Alkaline phosphatase. Normal bilirubin.  Ivone York MD

## 2017-12-28 NOTE — TELEPHONE ENCOUNTER
"Patient Information     Patient Name MRN Jewels Ferguson [id number] Female 1955      Telephone Encounter by Prabha Foote RN at 2017  9:45 AM     Author:  Prabha Foote RN Service:  (none) Author Type:  NURS- Registered Nurse     Filed:  2017 10:01 AM Encounter Date:  2017 Status:  Signed     :  Prabha Foote RN (NURS- Registered Nurse)            Patient calls complaining of chest pain. This is an ongoing problem. She has been seen in the ED, Clinic, and has been hospitalized at [facility]. Today she states she has been up all night due to the pain. She cannot lay down due to the pain. Last clinic visit noted pain was not cardiac in origin. Patient states she took some Tums, but it did not help. Due to history of heart attack, patient was advised that she needs to be seen in ED. Patient agrees.     Reason for Disposition    [1] Chest pain lasts > 5 minutes AND [2] history of heart disease  (i.e., heart attack, bypass surgery, angina, angioplasty, CHF; not just a heart murmur)    Answer Assessment - Initial Assessment Questions  1. LOCATION: \"Where does it hurt?\"        Right under \"boobs\"  2. RADIATION: \"Does the pain go anywhere else?\" (e.g., into neck, jaw, arms, back)      Down to stomach  3. ONSET: \"When did the chest pain begin?\" (Minutes, hours or days)       Yesterday afternoon  4. PATTERN \"Does the pain come and go, or has it been constant since it started?\"  \"Does it get worse with exertion?\"       Constant, better when sitting up, worse when lying down  5. DURATION: \"How long does it last\" (e.g., seconds, minutes, hours)      constant  6. SEVERITY: \"How bad is the pain?\"  (e.g., Scale 1-10; mild, moderate, or severe)     - MILD (1-3): doesn't interfere with normal activities      - MODERATE (4-7): interferes with normal activities or awakens from sleep     - SEVERE (8-10): excruciating pain, unable to do any normal activities  " "      \"Very close to 10\"  7. CARDIAC RISK FACTORS: \"Do you have any history of heart problems or risk factors for heart disease?\" (e.g., prior heart attack, angina; high blood pressure, diabetes, being overweight, high cholesterol, smoking, or strong family history of heart disease)      Recent heart attack, HTN  8. PULMONARY RISK FACTORS: \"Do you have any history of lung disease?\"  (e.g., blood clots in lung, asthma, emphysema, birth control pills)      no  9. CAUSE: \"What do you think is causing the chest pain?\"      thought acid reflux, but antacids have not helped  10. OTHER SYMPTOMS: \"Do you have any other symptoms?\" (e.g., dizziness, nausea, vomiting, sweating, fever, difficulty breathing, cough)        denies  11. PREGNANCY: \"Is there any chance you are pregnant?\" \"When was your last menstrual period?\"        N/a postmenopausal    Protocols used: ADULT CHEST PAIN-A-AH            "

## 2017-12-28 NOTE — TELEPHONE ENCOUNTER
Patient Information     Patient Name MRN Jewels Ferguson [id number] Female 1955      Telephone Encounter by Amira Munguia at 8/10/2017  3:44 PM     Author:  Amira Munguia Service:  (none) Author Type:  (none)     Filed:  8/10/2017  3:45 PM Encounter Date:  8/10/2017 Status:  Signed     :  Amira Munguia            Patient notified that form for transportation was faxed to [employer]/ insurance.  Amira Munguia LPN     8/10/2017 3:45 PM

## 2017-12-28 NOTE — PROGRESS NOTES
Patient Information     Patient Name MRN Sex Jewels Valerio [id number] Female 1955      Progress Notes by Brock Browne MD at 2017  2:15 PM     Author:  Brock Browne MD Service:  (none) Author Type:  Physician     Filed:  2017  4:37 PM Encounter Date:  2017 Status:  Signed     :  Brock Browne MD (Physician)            SUBJECTIVE:    Jewels Ferrer is a 61 y.o. female who presents for follow-up recent ER visit    HPI Comments: Patient arrives here for follow-up of recent ER visit. Patient's been having abdominal pain. And recently underwent a CT scan showing cholelithiasis. Patient reports symptoms have been going on for about 6 months on and off. She avoids milk and yogurt. Also reports symptoms worsen with ice cream but can occur suddenly. She is currently asymptomatic.      Allergies      Allergen   Reactions     Meclomen [Meclofenamate Sodium]  *Unknown     Naproxen  Nausea And Vomiting     Sulfa (Sulfonamide Antibiotics)  Rash     vomiting    ,   Family History       Problem   Relation Age of Onset     Other  Mother      Bronchiectasis/Chronic headaches       Hypertension  Father      Diabetes  Father      Heart Disease  Father      MI       Other  Sister      Chronic headaches     ,   Current Outpatient Prescriptions on File Prior to Visit       Medication  Sig Dispense Refill     acetaminophen (TYLENOL EXTRA STRENGTH) 500 mg tablet Take 1 tablet by mouth every 6 hours if needed. Max acetaminophen dose: 4000mg in 24 hrs.  0     atorvastatin (LIPITOR) 10 mg tablet Take 1 tablet by mouth at bedtime. 60 tablet 3     furosemide (LASIX) 20 mg tablet Take 1 tablet by mouth every morning. 90 tablet 3     HYDROcodone-acetaminophen, 5-325 mg, (NORCO) per tablet Take 1-2 tablets by mouth every 4 hours if needed  for Pain Max acetaminophen dose: 4000mg in 24 hrs. 15 tablet 0     lisinopril (PRINIVIL; ZESTRIL) 5 mg tablet Take 1 tablet by mouth once daily. 90 tablet 2      metoprolol tartrate (LOPRESSOR) 25 mg tablet TAKE 1 TABLET BY MOUTH TWICE DAILY 180 tablet 3     omeprazole (PRILOSEC) 40 mg Delayed-Release capsule Take 1 capsule by mouth once daily. 90 capsule 4     rivaroxaban (XARELTO) 20 mg tablet Take 20 mg by mouth once daily.       No current facility-administered medications on file prior to visit.    ,   Past Surgical History:      Procedure  Laterality Date     APPENDECTOMY  2/1995     collar bone dislocate      surgery       COLONOSCOPY SCREENING  4/2006    Normal screening. follow-up recommended in 10 years       DILATION AND CURETTAGE      for endometritis       KNEE ARTHROSCOPY  8/23/2007     left knee by Dr. Garcia       S/P KNEE ARTHROSCOPY Left 9/8/2014   ,   Social History        Substance Use Topics          Smoking status:   Former Smoker      Quit date:  8/28/2004      Smokeless tobacco:   Never Used      Alcohol use   Yes      Comment: occ      and   Social History        Substance Use Topics          Smoking status:   Former Smoker      Quit date:  8/28/2004      Smokeless tobacco:   Never Used      Alcohol use   Yes      Comment: occ         REVIEW OF SYSTEMS:  ROS    OBJECTIVE:  /60  Pulse 52  Wt 59.4 kg (131 lb)  BMI 23.21 kg/m2    EXAM:   Physical Exam   Constitutional: She is well-developed, well-nourished, and in no distress.   HENT:   Head: Normocephalic and atraumatic.   Cardiovascular: Normal rate, regular rhythm and normal heart sounds.    No murmur heard.  Pulmonary/Chest: Effort normal and breath sounds normal.   Abdominal: Soft. Bowel sounds are normal. She exhibits no distension. There is tenderness (slight amount). There is no rebound.       ASSESSMENT/PLAN:    ICD-10-CM    1. Calculus of gallbladder with chronic cholecystitis without obstruction K80.10 AMB CONSULT TO GENERAL SURGEON        Plan:  Referral to general surgery. CT scan reviewed.

## 2017-12-28 NOTE — TELEPHONE ENCOUNTER
Patient Information     Patient Name MRN Jewels Ferguson [id number] Female 1955      Telephone Encounter by Chelsea Chandra RN at 2017 10:47 AM     Author:  Chelsea Chandra RN Service:  (none) Author Type:  NURS- Registered Nurse     Filed:  2017 10:49 AM Encounter Date:  2017 Status:  Signed     :  Chelsea Chandra RN (NURS- Registered Nurse)            Received fax request for refill of Xarelto.  eRx on 17 for #  90 and 3 refills to [facility].    Rx request refused    Unable to complete prescription refill per RN Medication Refill Policy.................... Chelsea Chandra RN ....................  2017   10:49 AM

## 2017-12-28 NOTE — TELEPHONE ENCOUNTER
Patient Information     Patient Name MRN Jewels Ferguson [id number] Female 1955      Telephone Encounter by Ivania Roy at 2017 11:12 AM     Author:  Ivania Roy Service:  (none) Author Type:  (none)     Filed:  2017 11:13 AM Encounter Date:  2017 Status:  Signed     :  Ivania Roy            This patient has an appointment for lab on 17, states these are fasting labs from cardiology. Please place order   Thank you

## 2017-12-28 NOTE — TELEPHONE ENCOUNTER
Patient Information     Patient Name MRN Jewels Ferguson [id number] Female 1955      Telephone Encounter by Prabha Foote RN at 2017  2:48 PM     Author:  Prabha Foote RN Service:  (none) Author Type:  NURS- Registered Nurse     Filed:  2017  2:49 PM Encounter Date:  2017 Status:  Signed     :  Prabha Foote RN (NURS- Registered Nurse)            Now being managed by cardiology. Will forward to cardiology team for approval.    Prabha Foote RN........2017 2:48 PM

## 2017-12-28 NOTE — TELEPHONE ENCOUNTER
Patient Information     Patient Name MRN Jewels Ferguson [id number] Female 1955      Telephone Encounter by Chelsea Chandra RN at 2017 11:03 AM     Author:  Chelsea Chandra RN Service:  (none) Author Type:  NURS- Registered Nurse     Filed:  2017 11:10 AM Encounter Date:  2017 Status:  Signed     :  Chelsea Chandra RN (NURS- Registered Nurse)            Dr Ivone York saw pt in clinic today.  Pt mentioned she needed a refill of her Xarelto.  Dr York requested cardiology follow up on this as the pt only has enough medication through Monday.  She stopped by my desk to let me know pt needed refill.  Said I would t it up for Jorge.  Medication T'd up and routed to Jorge Amezcua NP to approve.    This is a Refill request from: patient  Name of Medication: Xarelto  Quantity requested: 90 with 3 refills  Last fill date:   Due for refill: yes  Last visit with JORGE AMEZCUA was on: 2017 in [facility]  PCP:  Brock Browne MD  Controlled Substance Agreement:  na   Diagnosis r/t this medication request: paroxysmal atrial fibrillation      Unable to complete prescription refill per RN Medication Refill Policy.................... Chelsea Chandra RN ....................  2017   11:06 AM

## 2017-12-28 NOTE — TELEPHONE ENCOUNTER
Patient Information     Patient Name MRN Sex Jewels Valerio [id number] Female 1955      Telephone Encounter by Chelsea Chandra RN at 2017  9:55 AM     Author:  Chelsea Chandra RN Service:  (none) Author Type:  NURS- Registered Nurse     Filed:  2017 10:29 AM Encounter Date:  2017 Status:  Signed     :  Chelsea Chandra RN (NURS- Registered Nurse)            Lasix 20 mg to be taken once daily in the morning [facility]  This is a new medication for pt.  Per VORB from Jorge Amezcua NP medication ordered and eRx'ed to [facility].  Attempted to call pt at both numbers.  No answer at either number, non-descript voice mails.    Diuretics (may be prescribed for edema)    Office visit in the past 12 months or per provider note.    Last visit with JORGE AMEZCUA was on: 2017 in [facility]  Next visit with JORGE AMEZCUA is on: 2017 in [facility]  Next visit with Cardiology is on: 2017 in [facility]    Lab testing requirements:  Creatinine and Potassium annually, if ordering lab, order BMP.  CREATININE (mg/dL)    Date Value   2017 0.40 (L)     POTASSIUM (mmol/L)    Date Value   2017 4.0     BP Readings from Last 4 Encounters:    17 120/78   17 110/64   17 144/80   17 98/60       Review last provider visit note.  If BP reviewed and plan is noted, can refill.  Max refill for 12 months from last office visit or per provider note.    Prescription refilled per RN Medication Refill Policy.................... Chelsea Chandra RN ....................  2017   10:29 AM

## 2017-12-29 NOTE — PATIENT INSTRUCTIONS
Patient Information     Patient Name MRN Sex Jewels Valerio [id number] Female 1955      Patient Instructions by Dina Whiteside NP at 2017  2:45 PM     Author:  Dina Whiteside NP  Service:  (none) Author Type:  PHYS- Nurse Practitioner     Filed:  2017  8:08 PM  Encounter Date:  2017 Status:  Addendum     :  Dina Whiteside NP (PHYS- Nurse Practitioner)        Related Notes: Original Note by Dina Whiteside NP (PHYS- Nurse Practitioner) filed at 2017  7:45 PM            Ice and elevate toe for first 48 hours  Tylenol/Ibuprofen to decrease pain and swelling  Hard soled shoe to protect toe  Limit time spent on feet  Please follow up with PCP if symptoms worsen or don't improve

## 2017-12-29 NOTE — PATIENT INSTRUCTIONS
Patient Information     Patient Name MRN Sex Jewels Valerio [id number] Female 1955      Patient Instructions by Ivone York MD at 2017  1:50 PM     Author:  Ivone York MD Service:  (none) Author Type:  Physician     Filed:  2017  2:20 PM Encounter Date:  2017 Status:  Signed     :  Ivone York MD (Physician)            Can trial low fat dairy-1% mild, low fat yogurt. Follow up with me after the US to discuss findings.

## 2017-12-30 NOTE — NURSING NOTE
Patient Information     Patient Name MRN Jewels Ferguson [id number] Female 1955      Nursing Note by Keesha Pimentel at 2017 12:45 PM     Author:  Keesha Pimentel Service:  (none) Author Type:  (none)     Filed:  2017  1:45 PM Encounter Date:  2017 Status:  Signed     :  Keesha Pimentel            Patient is here for a consult for Pulmonary  HTN. Keesha Pimentel LPN......................2017 12:46 PM

## 2017-12-30 NOTE — NURSING NOTE
Patient Information     Patient Name MRN Jewels Ferguson [id number] Female 1955      Nursing Note by Alka Brantley at 2017  1:50 PM     Author:  Alka Brantley Service:  (none) Author Type:  (none)     Filed:  2017  1:54 PM Encounter Date:  2017 Status:  Signed     :  Alka Brantley            Patient is here today for a consult regarding her gallbladder.  Alka Brantley LPN.......................... 2017  1:51 PM

## 2017-12-30 NOTE — NURSING NOTE
Patient Information     Patient Name MRN Jewels Ferguson [id number] Female 1955      Nursing Note by Amira Munguia at 2017  2:15 PM     Author:  Amira Munguia Service:  (none) Author Type:  (none)     Filed:  2017  2:22 PM Encounter Date:  2017 Status:  Signed     :  Amira Munguia            Patient here for follow up form ED for chest pain on , she also has concern for lisinopril she requested refill, they said it was discontinued. She will finish her last tablet tomorrow. Amira Munguia LPN .......................2017  2:18 PM

## 2017-12-30 NOTE — NURSING NOTE
Patient Information     Patient Name MRN Jewels Ferguson [id number] Female 1955      Nursing Note by Alka Brantley at 2017 10:20 AM     Author:  Alka Brantley Service:  (none) Author Type:  (none)     Filed:  2017 10:23 AM Encounter Date:  2017 Status:  Signed     :  Alka Brantley            Patient is here today for a follow on her gallbladder. She is aware she is due for a mammogram and colonoscopy.    Alka Brantley LPN.......................... 2017  10:20 AM

## 2017-12-30 NOTE — NURSING NOTE
Patient Information     Patient Name MRN Jewels Ferguson [id number] Female 1955      Nursing Note by Bonnie Keller at 2017  2:45 PM     Author:  Bonnie Keller Service:  (none) Author Type:  NURS- Student Practical Nurse     Filed:  2017  2:58 PM Encounter Date:  2017 Status:  Signed     :  Bonnie Keller (NURS- Student Practical Nurse)            Patient presents with left great toe pain. Patient stubbed it on end table this morning. Bonnie Keller LPN .............2017  2:45 PM

## 2017-12-30 NOTE — NURSING NOTE
Patient Information     Patient Name MRN Sex Jewels Valerio [id number] Female 1955      Nursing Note by Amira Munguia at 2017  2:15 PM     Author:  Amira Munguia Service:  (none) Author Type:  (none)     Filed:  2017  2:17 PM Encounter Date:  2017 Status:  Signed     :  Amira Munguia            Patient here for ED follow up for gallbladder. Amira Munguia LPN .......................2017  2:13 PM

## 2017-12-30 NOTE — NURSING NOTE
Patient Information     Patient Name MRN Sex Jewels Valerio [id number] Female 1955      Nursing Note by Chelsea Chandra RN at 2017  9:35 AM     Author:  Chelsea Chandra RN Service:  (none) Author Type:  NURS- Registered Nurse     Filed:  2017  9:37 AM Encounter Date:  2017 Status:  Signed     :  Chelsea Chandra RN (NURS- Registered Nurse)            Labs ordered per Nica Amezcua NP and sent to provider for co-sign.  Fasting labs were discussed at OV 17.  Pt here for lab work, no orders were entered.  Order put in per OV note.  Chelsea Chandra RN 2017 9:36 AM

## 2017-12-30 NOTE — NURSING NOTE
Patient Information     Patient Name MRN Sex Jewels Valerio [id number] Female 1955      Nursing Note by Jessica Roberson at 2017  1:45 PM     Author:  Jessica Roberson Service:  (none) Author Type:  (none)     Filed:  2017  1:51 PM Encounter Date:  2017 Status:  Signed     :  Jessica Roberson            Patient comes in for follow up on cardiology [place] visit.  Jessica Roberson LPN ....................  2017   1:51 PM

## 2017-12-30 NOTE — NURSING NOTE
Patient Information     Patient Name MRN Sex Jewels Valerio [id number] Female 1955      Nursing Note by Chelsea Chandra RN at 2017 12:45 PM     Author:  Chelsea Chandra RN Service:  (none) Author Type:  NURS- Registered Nurse     Filed:  2017  1:25 PM Encounter Date:  2017 Status:  Signed     :  Chelsea Chandra RN (NURS- Registered Nurse)            Performed EKG in clinic per VORB from Nica Amezcua NP.  Order sent to provider for co-sign.  Chelsea Chandra RN 2017 1:25 PM

## 2018-01-01 ENCOUNTER — OFFICE VISIT (OUTPATIENT)
Dept: FAMILY MEDICINE | Facility: OTHER | Age: 63
End: 2018-01-01
Attending: FAMILY MEDICINE
Payer: COMMERCIAL

## 2018-01-01 VITALS
WEIGHT: 136.6 LBS | SYSTOLIC BLOOD PRESSURE: 110 MMHG | DIASTOLIC BLOOD PRESSURE: 60 MMHG | HEART RATE: 60 BPM | BODY MASS INDEX: 24.2 KG/M2

## 2018-01-01 VITALS
DIASTOLIC BLOOD PRESSURE: 60 MMHG | WEIGHT: 132.8 LBS | BODY MASS INDEX: 23.52 KG/M2 | HEART RATE: 60 BPM | SYSTOLIC BLOOD PRESSURE: 116 MMHG

## 2018-01-01 DIAGNOSIS — I27.20 PULMONARY HYPERTENSION (H): ICD-10-CM

## 2018-01-01 DIAGNOSIS — Z12.31 ENCOUNTER FOR SCREENING MAMMOGRAM FOR BREAST CANCER: ICD-10-CM

## 2018-01-01 DIAGNOSIS — Z23 NEED FOR IMMUNIZATION AGAINST INFLUENZA: Primary | ICD-10-CM

## 2018-01-01 DIAGNOSIS — I10 HYPERTENSION, BENIGN: ICD-10-CM

## 2018-01-01 DIAGNOSIS — K29.30 CHRONIC SUPERFICIAL GASTRITIS WITHOUT BLEEDING: ICD-10-CM

## 2018-01-01 DIAGNOSIS — R10.13 ABDOMINAL PAIN, EPIGASTRIC: ICD-10-CM

## 2018-01-01 DIAGNOSIS — I48.0 PAROXYSMAL ATRIAL FIBRILLATION (H): Primary | ICD-10-CM

## 2018-01-01 DIAGNOSIS — E78.00 HYPERCHOLESTEROLEMIA: ICD-10-CM

## 2018-01-01 DIAGNOSIS — R73.9 BLOOD GLUCOSE ELEVATED: ICD-10-CM

## 2018-01-01 DIAGNOSIS — I50.30 DIASTOLIC HEART FAILURE, UNSPECIFIED HEART FAILURE CHRONICITY: ICD-10-CM

## 2018-01-01 LAB
ALBUMIN SERPL-MCNC: 3.9 G/DL (ref 3.5–5.7)
ALP SERPL-CCNC: 83 U/L (ref 34–104)
ALT SERPL W P-5'-P-CCNC: 36 U/L (ref 7–52)
ANION GAP SERPL CALCULATED.3IONS-SCNC: 5 MMOL/L (ref 3–14)
AST SERPL W P-5'-P-CCNC: 34 U/L (ref 13–39)
BILIRUB SERPL-MCNC: 0.6 MG/DL (ref 0.3–1)
BUN SERPL-MCNC: 16 MG/DL (ref 7–25)
CALCIUM SERPL-MCNC: 10 MG/DL (ref 8.6–10.3)
CHLORIDE SERPL-SCNC: 106 MMOL/L (ref 98–107)
CO2 SERPL-SCNC: 31 MMOL/L (ref 21–31)
CREAT SERPL-MCNC: 0.46 MG/DL (ref 0.6–1.2)
ERYTHROCYTE [DISTWIDTH] IN BLOOD BY AUTOMATED COUNT: 13.5 % (ref 10–15)
GFR SERPL CREATININE-BSD FRML MDRD: >90 ML/MIN/1.7M2
GLUCOSE SERPL-MCNC: 119 MG/DL (ref 70–105)
GLUCOSE SERPL-MCNC: 136 MG/DL (ref 70–105)
HBA1C MFR BLD: 5.4 % (ref 4–6)
HCT VFR BLD AUTO: 42.1 % (ref 35–47)
HGB BLD-MCNC: 13.7 G/DL (ref 11.7–15.7)
LIPASE SERPL-CCNC: 14 U/L (ref 11–82)
MCH RBC QN AUTO: 27.7 PG (ref 26.5–33)
MCHC RBC AUTO-ENTMCNC: 32.5 G/DL (ref 31.5–36.5)
MCV RBC AUTO: 85 FL (ref 78–100)
PLATELET # BLD AUTO: 236 10E9/L (ref 150–450)
POTASSIUM SERPL-SCNC: 4.3 MMOL/L (ref 3.5–5.1)
PROT SERPL-MCNC: 6.6 G/DL (ref 6.4–8.9)
RBC # BLD AUTO: 4.94 10E12/L (ref 3.8–5.2)
SODIUM SERPL-SCNC: 142 MMOL/L (ref 134–144)
WBC # BLD AUTO: 7.3 10E9/L (ref 4–11)

## 2018-01-01 PROCEDURE — 80053 COMPREHEN METABOLIC PANEL: CPT | Performed by: FAMILY MEDICINE

## 2018-01-01 PROCEDURE — G0463 HOSPITAL OUTPT CLINIC VISIT: HCPCS | Mod: 25

## 2018-01-01 PROCEDURE — 85027 COMPLETE CBC AUTOMATED: CPT | Performed by: FAMILY MEDICINE

## 2018-01-01 PROCEDURE — 99396 PREV VISIT EST AGE 40-64: CPT | Performed by: FAMILY MEDICINE

## 2018-01-01 PROCEDURE — 82947 ASSAY GLUCOSE BLOOD QUANT: CPT | Performed by: FAMILY MEDICINE

## 2018-01-01 PROCEDURE — 90471 IMMUNIZATION ADMIN: CPT

## 2018-01-01 PROCEDURE — 83690 ASSAY OF LIPASE: CPT | Performed by: FAMILY MEDICINE

## 2018-01-01 PROCEDURE — 90715 TDAP VACCINE 7 YRS/> IM: CPT | Performed by: FAMILY MEDICINE

## 2018-01-01 PROCEDURE — 96372 THER/PROPH/DIAG INJ SC/IM: CPT

## 2018-01-01 PROCEDURE — 36415 COLL VENOUS BLD VENIPUNCTURE: CPT | Performed by: FAMILY MEDICINE

## 2018-01-01 PROCEDURE — 83036 HEMOGLOBIN GLYCOSYLATED A1C: CPT | Performed by: FAMILY MEDICINE

## 2018-01-01 PROCEDURE — G0008 ADMIN INFLUENZA VIRUS VAC: HCPCS

## 2018-01-01 PROCEDURE — 99212 OFFICE O/P EST SF 10 MIN: CPT | Mod: 25 | Performed by: FAMILY MEDICINE

## 2018-01-01 PROCEDURE — 90686 IIV4 VACC NO PRSV 0.5 ML IM: CPT | Performed by: FAMILY MEDICINE

## 2018-01-01 PROCEDURE — G0463 HOSPITAL OUTPT CLINIC VISIT: HCPCS

## 2018-01-01 PROCEDURE — 99213 OFFICE O/P EST LOW 20 MIN: CPT | Performed by: FAMILY MEDICINE

## 2018-01-01 RX ORDER — FUROSEMIDE 20 MG
20 TABLET ORAL EVERY MORNING
Qty: 90 TABLET | Refills: 3 | Status: SHIPPED | OUTPATIENT
Start: 2018-01-01

## 2018-01-01 RX ORDER — OMEPRAZOLE 40 MG/1
40 CAPSULE, DELAYED RELEASE ORAL DAILY
Qty: 30 CAPSULE | Refills: 1 | Status: SHIPPED | OUTPATIENT
Start: 2018-01-01 | End: 2018-01-01

## 2018-01-01 RX ORDER — ATORVASTATIN CALCIUM 10 MG/1
10 TABLET, FILM COATED ORAL AT BEDTIME
Qty: 90 TABLET | Refills: 3 | Status: SHIPPED | OUTPATIENT
Start: 2018-01-01

## 2018-01-01 RX ORDER — OMEPRAZOLE 40 MG/1
40 CAPSULE, DELAYED RELEASE ORAL DAILY
Qty: 90 CAPSULE | Refills: 2 | Status: SHIPPED | OUTPATIENT
Start: 2018-01-01

## 2018-01-01 RX ORDER — METOPROLOL TARTRATE 25 MG/1
25 TABLET, FILM COATED ORAL 2 TIMES DAILY
Qty: 180 TABLET | Refills: 3 | Status: SHIPPED | OUTPATIENT
Start: 2018-01-01

## 2018-01-01 RX ORDER — LISINOPRIL 5 MG/1
5 TABLET ORAL DAILY
Qty: 90 TABLET | Refills: 3 | Status: SHIPPED | OUTPATIENT
Start: 2018-01-01

## 2018-01-01 ASSESSMENT — PATIENT HEALTH QUESTIONNAIRE - PHQ9
SUM OF ALL RESPONSES TO PHQ QUESTIONS 1-9: 0
5. POOR APPETITE OR OVEREATING: NOT AT ALL

## 2018-01-01 ASSESSMENT — ENCOUNTER SYMPTOMS
NAUSEA: 0
PSYCHIATRIC NEGATIVE: 1
HEMATOCHEZIA: 0
VOMITING: 0
FEVER: 0
ABDOMINAL DISTENTION: 0
HEARTBURN: 0
RESPIRATORY NEGATIVE: 1
DIZZINESS: 0
DIARRHEA: 0
EYES NEGATIVE: 1
ANAL BLEEDING: 0
CHILLS: 0
ABDOMINAL PAIN: 1
CONSTIPATION: 0

## 2018-01-01 ASSESSMENT — ANXIETY QUESTIONNAIRES
2. NOT BEING ABLE TO STOP OR CONTROL WORRYING: NOT AT ALL
6. BECOMING EASILY ANNOYED OR IRRITABLE: NOT AT ALL
GAD7 TOTAL SCORE: 0
3. WORRYING TOO MUCH ABOUT DIFFERENT THINGS: NOT AT ALL
5. BEING SO RESTLESS THAT IT IS HARD TO SIT STILL: NOT AT ALL
GAD7 TOTAL SCORE: 0
1. FEELING NERVOUS, ANXIOUS, OR ON EDGE: NOT AT ALL
7. FEELING AFRAID AS IF SOMETHING AWFUL MIGHT HAPPEN: NOT AT ALL

## 2018-01-01 ASSESSMENT — PAIN SCALES - GENERAL
PAINLEVEL: NO PAIN (0)
PAINLEVEL: NO PAIN (0)

## 2018-01-02 ENCOUNTER — HISTORY (OUTPATIENT)
Dept: SURGERY | Facility: OTHER | Age: 63
End: 2018-01-02

## 2018-01-04 NOTE — PROGRESS NOTES
Patient Information     Patient Name MRN Sex Jewels Valerio [id number] Female 1955      Progress Notes by Brock Browne MD at 2017 10:30 AM     Author:  Brock Browne MD Service:  (none) Author Type:  Physician     Filed:  2017  2:28 PM Encounter Date:  2017 Status:  Signed     :  Brock Browne MD (Physician)            SUBJECTIVE:    Jewels Ferrer is a 61 y.o. female who presents for follow-up chest pains    HPI Comments: Patient arrives here for she was having chest pains at night and did present to the ER. She was found to have a slightly elevated troponin. On  she was sent to the [facility] where she underwent an angiogram. Her angiogram report is available and was completely clear. Echo did show severe mitral regurgitation. Patient states that she has a follow-up appointment in a week or 2. The pains are going down since she stopped caffeine. She is having less morning discomfort.      Allergies      Allergen   Reactions     Meclomen [Meclofenamate Sodium]  *Unknown     Naproxen  Nausea And Vomiting     Sulfa (Sulfonamide Antibiotics)  Rash     vomiting    ,   Family History       Problem   Relation Age of Onset     Other  Mother      Bronchiectasis/Chronic headaches       Hypertension  Father      Diabetes  Father      Heart Disease  Father      MI       Other  Sister      Chronic headaches     ,   Patient Active Problem List     Diagnosis  Code     COLONOSCOPY, HX OF  Z87.19     HYPERTENSION, BENIGN I10     HEADACHE, CHRONIC R51     G E R D K21.9     UTI'S, RECURRENT N39.0     HYPERCHOLESTEROLEMIA E78.00     TENOSYNOVITIS M65.9     BACK PAIN M54.9     MENOPAUSAL SYNDROME N95.1     Left knee DJD M17.9     Medial meniscus tear S83.249A     Chondral defect of femoral condyle M23.8X9     S/P arthroscopic knee surgery Z98.890     Hypoxia R09.02     Pulmonary hypertension (HC) I27.2     Obstructive sleep apnea syndrome G47.33   ,   Past Surgical History:       Procedure  Laterality Date     APPENDECTOMY  2/1995     collar bone dislocate      surgery       COLONOSCOPY SCREENING  4/2006    Normal screening. follow-up recommended in 10 years       DILATION AND CURETTAGE      for endometritis       KNEE ARTHROSCOPY  8/23/2007     left knee by Dr. Garcia       S/P KNEE ARTHROSCOPY Left 9/8/2014   ,   Social History        Substance Use Topics          Smoking status:   Former Smoker      Quit date:  8/28/2004      Smokeless tobacco:   Never Used      Alcohol use   Yes      Comment: occ      and   Social History        Substance Use Topics          Smoking status:   Former Smoker      Quit date:  8/28/2004      Smokeless tobacco:   Never Used      Alcohol use   Yes      Comment: occ         REVIEW OF SYSTEMS:  ROS    OBJECTIVE:  BP 98/60  Pulse 52  Wt 63.8 kg (140 lb 9.6 oz)  BMI 24.91 kg/m2    EXAM:   Physical Exam   Constitutional: She is well-developed, well-nourished, and in no distress.   HENT:   Right Ear: External ear normal.   Left Ear: External ear normal.   Cardiovascular: Normal rate and regular rhythm.    Murmur heard.  Pulmonary/Chest: Effort normal and breath sounds normal.       ASSESSMENT/PLAN:    ICD-10-CM    1. Chest pain in adult R07.9         Plan:  Noncardiac in origin. Her discharge summary is reviewed. She is showing some improvement with cutting down on her caffeine. Continue to eliminate caffeine from her diet. Follow-up when necessary.

## 2018-01-04 NOTE — TELEPHONE ENCOUNTER
"Patient Information     Patient Name MRN Jewels Ferguson [id number] Female 1955      Telephone Encounter by Prabha Foote RN at 2017  3:33 PM     Author:  Prabha Foote RN Service:  (none) Author Type:  NURS- Registered Nurse     Filed:  2017  3:45 PM Encounter Date:  2017 Status:  Signed     :  Prabha Foote RN (NURS- Registered Nurse)            Patient was transferred from scheduling due to complaints of chest pain. She was recently in the ED, then transferred to [facility]. She is going through further cardiac testing, but calls today because the pain gets worse at night and she is unable to lay down, which makes it hard for her to sleep. She also states she drinks close to a pot of coffee a day and wonders if that may be contributing to her problems. She is not currently having any chest pain. Per triage protocol, patient was transferred back to scheduling for appointment tomorrow to discuss these ongoing issues.    Reason for Disposition    [1] Chest pain lasts > 5 minutes AND [2] occurred > 3 days ago (72 hours) AND [3] NO chest pain or cardiac symptoms now    Answer Assessment - Initial Assessment Questions  1. LOCATION: \"Where does it hurt?\"        Under breasts, midline  2. RADIATION: \"Does the pain go anywhere else?\" (e.g., into neck, jaw, arms, back)      Sometimes down to stomach  3. ONSET: \"When did the chest pain begin?\" (Minutes, hours or days)       3/27/17  4. PATTERN \"Does the pain come and go, or has it been constant since it started?\"  \"Does it get worse with exertion?\"       Comes and goes  5. DURATION: \"How long does it last\" (e.g., seconds, minutes, hours)      Varies minutes to hours  6. SEVERITY: \"How bad is the pain?\"  (e.g., Scale 1-10; mild, moderate, or severe)     - MILD (1-3): doesn't interfere with normal activities      - MODERATE (4-7): interferes with normal activities or awakens from sleep     - SEVERE (8-10): excruciating " "pain, unable to do any normal activities        0/10 currently  7. CARDIAC RISK FACTORS: \"Do you have any history of heart problems or risk factors for heart disease?\" (e.g., prior heart attack, angina; high blood pressure, diabetes, being overweight, high cholesterol, smoking, or strong family history of heart disease)      HTN  8. PULMONARY RISK FACTORS: \"Do you have any history of lung disease?\"  (e.g., blood clots in lung, asthma, emphysema, birth control pills)      none  9. CAUSE: \"What do you think is causing the chest pain?\"      Heartburn or \"leaky valve\"  10. OTHER SYMPTOMS: \"Do you have any other symptoms?\" (e.g., dizziness, nausea, vomiting, sweating, fever, difficulty breathing, cough)      no  11. PREGNANCY: \"Is there any chance you are pregnant?\" \"When was your last menstrual period?\"      n/a    Protocols used: ADULT CHEST PAIN-A-AH            "

## 2018-01-04 NOTE — NURSING NOTE
Patient Information     Patient Name MRN Jewels Ferguson [id number] Female 1955      Nursing Note by Amira Munguia at 2017 10:30 AM     Author:  Amira Munguia Service:  (none) Author Type:  (none)     Filed:  2017 10:38 AM Encounter Date:  2017 Status:  Signed     :  Amira Munguia            Patient here for chest pains not active now, was directed to cut down  On the coffee which could increase chest pain. Had a small cup at 9 am nonce since and she did not have chest pain at all. She was in ED then transferred to . Amira Munguia LPN .......................2017  10:36 AM

## 2018-01-05 NOTE — PROGRESS NOTES
Patient Information     Patient Name MRN Sex Jewels Valerio [id number] Female 1955      Progress Notes by Tessie Rios CNMT at 2017 10:11 AM     Author:  Tessie Rios CNMT Service:  (none) Author Type:  [employer] - Registered Radiologic Technologist     Filed:  2017 10:12 AM Date of Service:  2017 10:11 AM Status:  Signed     :  Tessie Rios CNMT ([employer] - Registered Radiologic Technologist)            Falls Risk Criteria:    Age 65 and older or under age 4        Sensory deficits    Poor vision    Use of ambulatory aides    Impaired judgment    Unable to walk independently    Meets High Risk criteria for falls:  yes

## 2018-01-05 NOTE — PROGRESS NOTES
Patient Information     Patient Name MRN Sex Jewels Valerio [id number] Female 1955      Progress Notes by Tessie Rios CNMT at 2017 10:12 AM     Author:  Tessie Rios CNMT Service:  (none) Author Type:  [employer] - Registered Radiologic Technologist     Filed:  2017 10:12 AM Date of Service:  2017 10:12 AM Status:  Signed     :  Tessie Rios CNMT ([facility] - Registered Radiologic Technologist)                       1.  Do you have dizziness or vertigo?    yes                    2.  Do you need help standing or walking?   yes                 3.  Have you fallen within the last 6 months?    no           4.  Has the patient been fasting?      no       If any risks are marked Yes, the following interventions are utilized:    Do not leave patient unattended     Assist patient in the dressing room and bathroom    Have ambulatory aides available throughout procedure    Involve patient s family if available

## 2018-01-10 ENCOUNTER — AMBULATORY - GICH (OUTPATIENT)
Dept: SCHEDULING | Facility: OTHER | Age: 63
End: 2018-01-10

## 2018-01-21 ENCOUNTER — HEALTH MAINTENANCE LETTER (OUTPATIENT)
Age: 63
End: 2018-01-21

## 2018-01-25 VITALS
SYSTOLIC BLOOD PRESSURE: 120 MMHG | HEART RATE: 56 BPM | SYSTOLIC BLOOD PRESSURE: 120 MMHG | WEIGHT: 132.4 LBS | BODY MASS INDEX: 23.68 KG/M2 | BODY MASS INDEX: 23.75 KG/M2 | HEART RATE: 64 BPM | SYSTOLIC BLOOD PRESSURE: 120 MMHG | HEART RATE: 60 BPM | WEIGHT: 135.8 LBS | DIASTOLIC BLOOD PRESSURE: 62 MMHG | DIASTOLIC BLOOD PRESSURE: 70 MMHG | HEART RATE: 56 BPM | BODY MASS INDEX: 23.08 KG/M2 | DIASTOLIC BLOOD PRESSURE: 64 MMHG | WEIGHT: 132 LBS | SYSTOLIC BLOOD PRESSURE: 110 MMHG | WEIGHT: 136.2 LBS | DIASTOLIC BLOOD PRESSURE: 78 MMHG

## 2018-01-25 VITALS
DIASTOLIC BLOOD PRESSURE: 72 MMHG | SYSTOLIC BLOOD PRESSURE: 110 MMHG | BODY MASS INDEX: 22.84 KG/M2 | WEIGHT: 131 LBS | SYSTOLIC BLOOD PRESSURE: 120 MMHG | DIASTOLIC BLOOD PRESSURE: 60 MMHG | HEART RATE: 52 BPM | WEIGHT: 131 LBS | HEART RATE: 72 BPM | BODY MASS INDEX: 23.56 KG/M2

## 2018-01-25 VITALS — HEART RATE: 55 BPM | SYSTOLIC BLOOD PRESSURE: 110 MMHG | DIASTOLIC BLOOD PRESSURE: 70 MMHG | TEMPERATURE: 95.4 F

## 2018-01-25 VITALS
HEART RATE: 52 BPM | DIASTOLIC BLOOD PRESSURE: 60 MMHG | BODY MASS INDEX: 25.29 KG/M2 | SYSTOLIC BLOOD PRESSURE: 98 MMHG | WEIGHT: 140.6 LBS

## 2018-01-30 ENCOUNTER — DOCUMENTATION ONLY (OUTPATIENT)
Dept: FAMILY MEDICINE | Facility: OTHER | Age: 63
End: 2018-01-30

## 2018-01-30 PROBLEM — Z90.49 S/P LAPAROSCOPIC CHOLECYSTECTOMY: Status: ACTIVE | Noted: 2018-01-02

## 2018-01-30 PROBLEM — K80.12 CHOLELITHIASIS WITH ACUTE ON CHRONIC CHOLECYSTITIS: Status: ACTIVE | Noted: 2017-12-07

## 2018-01-30 PROBLEM — J96.01 ACUTE RESPIRATORY FAILURE WITH HYPOXIA AND HYPERCARBIA (H): Status: ACTIVE | Noted: 2017-12-11

## 2018-01-30 PROBLEM — E78.00 HYPERCHOLESTEROLEMIA: Status: ACTIVE | Noted: 2018-01-30

## 2018-01-30 PROBLEM — I50.30 DIASTOLIC HEART FAILURE (H): Status: ACTIVE | Noted: 2017-07-27

## 2018-01-30 PROBLEM — K80.20 CHOLELITHIASES: Status: ACTIVE | Noted: 2017-12-08

## 2018-01-30 PROBLEM — J96.02 ACUTE RESPIRATORY FAILURE WITH HYPOXIA AND HYPERCARBIA (H): Status: ACTIVE | Noted: 2017-12-11

## 2018-01-30 PROBLEM — I45.10 RBBB (RIGHT BUNDLE BRANCH BLOCK): Status: ACTIVE | Noted: 2017-12-08

## 2018-01-30 PROBLEM — I48.0 PAROXYSMAL ATRIAL FIBRILLATION (H): Status: ACTIVE | Noted: 2017-07-27

## 2018-01-30 PROBLEM — K80.20 CALCULUS OF GALLBLADDER WITHOUT CHOLECYSTITIS WITHOUT OBSTRUCTION: Status: ACTIVE | Noted: 2017-09-01

## 2018-01-30 PROBLEM — I34.0 NON-RHEUMATIC MITRAL REGURGITATION: Status: ACTIVE | Noted: 2017-06-22

## 2018-01-30 RX ORDER — FUROSEMIDE 20 MG
20 TABLET ORAL EVERY MORNING
COMMUNITY
Start: 2017-07-27 | End: 2018-08-07

## 2018-01-30 RX ORDER — LISINOPRIL 5 MG/1
5 TABLET ORAL DAILY
COMMUNITY
Start: 2017-06-21 | End: 2018-04-04

## 2018-01-30 RX ORDER — METOPROLOL TARTRATE 25 MG/1
25 TABLET, FILM COATED ORAL 2 TIMES DAILY
COMMUNITY
Start: 2017-08-25 | End: 2018-09-08

## 2018-01-30 RX ORDER — ATORVASTATIN CALCIUM 10 MG/1
10 TABLET, FILM COATED ORAL AT BEDTIME
COMMUNITY
Start: 2017-08-08 | End: 2018-04-05

## 2018-01-30 RX ORDER — OXYCODONE AND ACETAMINOPHEN 5; 325 MG/1; MG/1
1 TABLET ORAL EVERY 4 HOURS PRN
COMMUNITY
Start: 2017-12-11 | End: 2018-04-23

## 2018-01-30 RX ORDER — ACETAMINOPHEN 500 MG
500-1000 TABLET ORAL EVERY 6 HOURS PRN
COMMUNITY
Start: 2017-12-09

## 2018-01-30 ASSESSMENT — PATIENT HEALTH QUESTIONNAIRE - PHQ9
SUM OF ALL RESPONSES TO PHQ QUESTIONS 1-9: 0
SUM OF ALL RESPONSES TO PHQ QUESTIONS 1-9: 0

## 2018-02-09 VITALS
BODY MASS INDEX: 25.54 KG/M2 | DIASTOLIC BLOOD PRESSURE: 64 MMHG | SYSTOLIC BLOOD PRESSURE: 118 MMHG | HEART RATE: 68 BPM | WEIGHT: 142 LBS

## 2018-02-09 VITALS
TEMPERATURE: 97.4 F | BODY MASS INDEX: 22.92 KG/M2 | DIASTOLIC BLOOD PRESSURE: 68 MMHG | SYSTOLIC BLOOD PRESSURE: 118 MMHG | WEIGHT: 127.4 LBS | HEART RATE: 68 BPM

## 2018-02-12 NOTE — ED NOTES
Patient Information     Patient Name MRN Jewels Ferguson [id number] Female 1955      ED Nursing Note by Shyla Canales RN at 2017  8:13 PM     Author:  Shyla Canales RN Service:  (none) Author Type:  NURS- Registered Nurse     Filed:  2017  8:15 PM Date of Service:  2017  8:13 PM Status:  Signed     :  Shyla Canales RN (NURS- Registered Nurse)            ED Nursing Triage Note (General)   ________________________________    Patient presents to triage ambulatory, Significant symptoms per patient include states she is having a gallbladder attack.  Severe epigastric pain.  Took a Hydrocodone. Acetaminophen about 1/2 hr ago and has not helped., Patient is alert with cooperative.behavior, Breathing noted as easy, non labored, General appearance noted as unremarkable with skin of normal color and Action taken triage to waiting room    Pre hospital prior living situation: Home

## 2018-02-12 NOTE — ED NOTES
"Patient Information     Patient Name MRN Jewels Ferguson [id number] Female 1955      ED Nursing Note by Jammie Palomino RN at 2017 11:58 PM     Author:  Jammie Palomino RN Service:  (none) Author Type:  NURS- Registered Nurse     Filed:  2017 11:59 PM Date of Service:  2017 11:58 PM Status:  Signed     :  Jammie Palomino RN (NURS- Registered Nurse)            Patient states that she continues to have abdominal pain 6/10.  When asked if she wanted something for pain patient states, \"I'm doing alright right now\".  Patient resting with eyes closed, antibiotic and IVFs going at this time.  Sp02 99% on 2L via NC.  No other needs at this time, awaiting admission to [facility].         "

## 2018-02-12 NOTE — ED PROVIDER NOTES
Patient Information     Patient Name MRN Sex Jewels Valerio [id number] Female 1955      ED Provider Note by Riaz Nelson MD at 2017 11:25 PM     Author:  Riaz Nelson MD Service:  (none) Author Type:  Physician     Filed:  2017 11:29 PM Date of Service:  2017 11:25 PM Status:  Signed     :  Riaz Nelson MD (Physician)            PATIENT:  Jewels Ferrer       62 y.o.       female       MRN #:  [id number]    CHIEF COMPLAINT:  Abdominal Pain      HPI 62y who has known gallstone disease, and has had a recent ultrasound, has had episodes of biliary colic in the recent past.    She had been in good health recently, but ate blueberry pie at 7p, and soon after developed epigastric pain and a lot of nausea.  No vomiting or diarrhea but significant pain.    No melena.  No recent reflux or heartburn.    Past surgical abdominal hx includes appendix.    No recent fevers.    Besides this pain she has no concerns.    ALLERGIES:  Meclomen [meclofenamate sodium]; Naproxen; and Sulfa (sulfonamide antibiotics)    CURRENT MEDICATIONS:    acetaminophen (TYLENOL EXTRA STRENGTH) 500 mg tablet   atorvastatin (LIPITOR) 10 mg tablet   furosemide (LASIX) 20 mg tablet   HYDROcodone-acetaminophen, 5-325 mg, (NORCO) per tablet   lisinopril (PRINIVIL; ZESTRIL) 5 mg tablet   metoprolol tartrate (LOPRESSOR) 25 mg tablet   rivaroxaban (XARELTO) 20 mg tablet     PROBLEM LIST:       HYPERTENSION, BENIGN      HYPERCHOLESTEROLEMIA      BACK PAIN      Calculus of gallbladder without cholecystitis without obstruction      Paroxysmal atrial fibrillation (HC)      Diastolic heart failure (HC)      Non-rheumatic mitral regurgitation      Balance disorder      Chest pain in adult      Obstructive sleep apnea syndrome      Pulmonary hypertension      S/P arthroscopic knee surgery      Hypoxia      Chondral defect of femoral condyle      Medial meniscus tear      Left knee DJD       "HEADACHE, CHRONIC      G E R D      COLONOSCOPY, HX OF 2008      UTI'S, RECURRENT      TENOSYNOVITIS      MENOPAUSAL SYNDROME        PAST MEDICAL HISTORY:    Past Medical History:     Diagnosis  Date     Chest pain 8/3/2004     Chondral defect of femoral condyle 8/22/2014     Medial meniscus tear 8/8/2014     S/P arthroscopic knee surgery 9/8/2014     Severe obstructive sleep apnea 2015     PAST SURGICAL HISTORY:    Past Surgical History:      Procedure  Laterality Date     Appendectomy  2/1995     Collar bone dislocate       Colonoscopy screening  4/2006     Dilation and curettage       Knee arthroscopy  8/23/2007     S/p knee arthroscopy Left 9/8/2014     FAMILY HISTORY:    Family History       Problem   Relation Age of Onset     Other  Mother      Bronchiectasis/Chronic headaches       Hypertension  Father      Diabetes  Father      Heart Disease  Father      MI       Other  Sister      Chronic headaches       SOCIAL HISTORY:  Marital Status:   [2]  Employment Status:  Not Employed [3]   Occupation:    Substance Use:  Smoking status: Former Smoker                                                              Packs/day: 0.00      Years: 0.00         Quit date: 8/28/2004  Smokeless status: Never Used                      Alcohol use: Yes              Comment: occ       Review of Systems no fevers, chills, HEENT, SOB, chest pain, abdominal pain, urinary symptoms.    Patient Vitals for the past 24 hrs:   BP Temp Pulse Resp SpO2 Height Weight   12/07/17 2231 119/62 - 85 18 92 % - -   12/07/17 2116 128/59 96.4  F (35.8  C) 78 20 98 % - -   12/07/17 2016 138/72 97.8  F (36.6  C) 94 (!) 24 95 % 1.6 m (5' 3\") 59 kg (130 lb)      Physical Exam well woman with stable vitals.  No tachycardia or hypotension.  Afebrile.  Heart reg.  Lungs clear.  Abd with positive Trevizo's sign and positive epigastric tenderness.  She also has some moderate LLQ tenderness.  Otherwise ND, NABS.  No rebound or involuntary " guarding.    ECG:      IMAGING:  Xr abd unremarkable.  CT abd/pelvis shows acute cholecystitis, no evidence of common bile duct stone.    LABORATORY:  LFTs, bilirubin, lipase normal.  WBC normal.      ED COURSE:  ED Course     She is given 0.5mg dilaudid IV for pain.        IMPRESSION and PLAN:      Spoke with .  Patient will be admitted here and surgery done, but will likely need to wait 2 days due to Xarelto.  For the time being, will start unasyn 3g IV q6hrs, gently hydrate with IV fluids, and use judicious amounts of IV pain medication.  Sonya will admit.   Will hold all oral meds at this time.    ENCOUNTER DIAGNOSES:  ENCOUNTER DIAGNOSES        ICD-10-CM    1. Acute cholecystitis K81.0        Coding    Riaz Nelson MD  DOS: 12/7/2017   [facility]

## 2018-02-12 NOTE — PROGRESS NOTES
Patient Information     Patient Name MRN Jewels Ferguson [id number] Female 1955      Progress Notes by Kelsea Turner RT at 2017  9:04 AM     Author:  Kelsea Turner RT Service:  (none) Author Type:  RT- Respiratory Therapist     Filed:  2017  9:05 AM Date of Service:  2017  9:04 AM Status:  Signed     :  Kelsea Turner RT (RT- Respiratory Therapist)            Jewels Ferrer WAS INSTRUCTED ON THE USE OF IS TODAY.  PATIENT ACHIEVED 500 MLS OUT OF THE PREDICTED 1500 MLS BASED ON AGE AND HEIGHT.  PATIENT WILL USE IS, 10 BREATHS EVERY HOUR WHILE AWAKE FOLLOWED BY A STRONG COUGH TO KEEP LUNGS OPEN AND CLEAR WHILE HERE IN THE HOSPITAL.  PATIENT WAS ALSO INSTRUCTED TO SPLINT THEIR INCISION IF INDICATED.  PATIENT WILL CONTINUE TO USE IS UNTIL ABLE TO RESUME NORMAL DAILY ACTIVITIES.

## 2018-02-12 NOTE — PROGRESS NOTES
Patient Information     Patient Name MRN Jewels Ferguson [id number] Female 1955      Progress Notes by Rony Elizabeth PharmD at 2017 12:00 PM     Author:  Rony Elizabeth PharmD Service:  (none) Author Type:  PHARM- Pharmacist     Filed:  2017 12:01 PM Date of Service:  2017 12:00 PM Status:  Signed     :  Rony Elizabeth PharmD (PHARM- Pharmacist)            Pharmacy -- Admission Medication Reconciliation    Prior to admission (PTA) medications were reviewed and the patient's PTA medication list was updated.     Sources Consulted: MEJIA Sam, Patient interview    The reliability of this Medication Reconciliation is: HIGH.    The following significant changes were made:  -Xarelto updated to QAM  -Norco removed    In addition, the patient's allergies were reviewed with the patient and updated as follows -  Allergies      Allergen   Reactions     Meclomen [Meclofenamate Sodium]  *Unknown     Naproxen  Nausea And Vomiting and *Unknown - Pt Doesn't Remember     Sulfa (Sulfonamide Antibiotics)  Rash     vomiting        The pharmacist has reviewed with the patient that all personal medications should be removed from the building or locked in the belongings safe. Patient shall only take medications ordered by the physician and administered by the nursing staff.     Pharmacy Discharge Planning      Medication barriers identified:  none    Medication adherence concerns:  none    Understanding of emergency medications:  n/a    Rony Elizabeth PharmD ....................  2017   12:00 PM

## 2018-02-12 NOTE — PROGRESS NOTES
Patient Information     Patient Name MRN Sex Jewels Valerio [id number] Female 1955      Progress Notes by Dara Hammond at 2017 10:07 PM     Author:  Dara Hammond Service:  (none) Author Type:  Other Clinical Staff     Filed:  2017 10:07 PM Date of Service:  2017 10:07 PM Status:  Signed     :  Dara Hammond (Other Clinical Staff)            1.  Has the patient had a previous reaction to IV contrast? No    2.  Does the patient have kidney disease? No    3.  Is the patient on dialysis? No    If YES to any of these questions, exam will be reviewed with a Radiologist before administering contrast.

## 2018-02-12 NOTE — CARE COORDINATION
Patient Information     Patient Name MRN Jewels Ferguson [id number] Female 1955      Case Mgmt by Ivania Almaguer RN at 2017 11:02 AM     Author:  Ivania Almaguer RN Service:  (none) Author Type:  NURS- Registered Nurse     Filed:  2017 11:02 AM Date of Service:  2017 11:02 AM Status:  Signed     :  Ivania Almaguer RN (NURS- Registered Nurse)            First level admission review completed using [facility] care guidelines.  Patient is appropriate for inpatient status per guideline: M-555.  Ivania Almaguer RN ....................  2017   11:02 AM

## 2018-02-12 NOTE — ED NOTES
Patient Information     Patient Name MRN Sex Jewels Valerio [id number] Female 1955      ED Nursing Note by Jammie Palomino RN at 2017 12:34 AM     Author:  Jammie Palomino RN Service:  (none) Author Type:  NURS- Registered Nurse     Filed:  2017 12:36 AM Date of Service:  2017 12:34 AM Status:  Signed     :  Jammie Palomino RN (NURS- Registered Nurse)            ADMISSION/TRANSPORT NOTE    Jewels Ferrer will be transported to [facility] room 352 by cart with IV(s). IV Site #1 Status: infusing with fluids: NS .    Transport team included: CNA    Report called to MIAN Armando    Pain Level: 5  Acute Pain Intensity (0-10): 8  Functional Pain Scale (0-5): Tolerable (and doesn't prevent any activities)  Post Intervention Intensity (0-10): 5

## 2018-02-12 NOTE — ED NOTES
Patient Information     Patient Name MRN Sex Jewels Valerio [id number] Female 1955      ED Nursing Note by Jammie Palomino RN at 2017  9:04 PM     Author:  Jammie Palomino RN  Service:  (none) Author Type:  NURS- Registered Nurse     Filed:  2017  9:12 PM  Date of Service:  2017  9:04 PM Status:  Addendum     :  Jammie Palomino RN (NURS- Registered Nurse)        Related Notes: Original Note by Jammie Palomino RN (NURS- Registered Nurse) filed at 2017  9:11 PM            ED Nursing Assessment  ________________________________    GENERAL APPEARANCE:   No apparent distress, Skin Color is pink  ABDOMEN/GI:   Contour of abdomen is soft, Bowel sounds are hyperactive, Pain present in epigastric, and nontender on palpation.  When MD palpated abdomen patient states it is tender in LLQ.   GENITOURINARY:   Denies problem   Having normal bowel movements every day per patient.    REPRODUCTIVE:   Denies problem    Per patient, saw Dr. German a couple months ago for epigastric pain after seeing other doctors several times and was referred to Dr. German after viewing ultrasound taken in [place] MD told patient that if she continued to have abdominal pain she may need gall bladder removed and to be seen if having discomfort.  Patient states that in the last couple hours abdominal pain described as epigastric pain has increased.

## 2018-02-12 NOTE — PROGRESS NOTES
Patient Information     Patient Name MRN Sex Jewels Valerio [id number] Female 1955      Progress Notes by Ana Rabago RN at 2017  1:54 PM     Author:  Ana Rabago RN Service:  (none) Author Type:  NURS- Registered Nurse     Filed:  2017  1:56 PM Date of Service:  2017  1:54 PM Status:  Signed     :  Ana Rabago RN (NURS- Registered Nurse)            Discharge Note    Data:  Jewels Ferrer has been discharged home at 1355 via wheelchair accompanied by Nursing Assistant and Family.      Action:  Written discharge/follow-up instructions were provided to patient. Prescriptions : None.  Belongings sent with patient. Medications from home sent with patient/family: Not Applicable  Equipment none .     Response:  Patient verbalized understanding of discharge instructions, reason for discharge, and necessary follow-up appointments. Surgical prep was discussed and reinforced with pt and family.

## 2018-02-12 NOTE — ED NOTES
Patient Information     Patient Name MRN Jewels Ferguson [id number] Female 1955      ED Nursing Note by Jammie Palomino RN at 2017 12:21 AM     Author:  Jammie Palomino RN Service:  (none) Author Type:  NURS- Registered Nurse     Filed:  2017 12:21 AM Date of Service:  2017 12:21 AM Status:  Signed     :  Jammie Palomino RN (NURS- Registered Nurse)            Report given to MIAN Armando.

## 2018-02-12 NOTE — PROGRESS NOTES
"Patient Information     Patient Name MRN Jewels Ferguson [id number] Female 1955      Progress Notes by Wander Meza MD at 2017  8:37 AM     Author:  Wander Meza MD  Service:  (none) Author Type:  Physician     Filed:  2017  1:52 PM  Date of Service:  2017  8:37 AM Status:  Addendum     :  Wander Meza MD (Physician)        Related Notes: Original Note by Wander Mzea MD (Physician) filed at 2017  9:46 AM            Hospitalist Progress Note    H&P/Surg Consult note, CT report and lab results rev'd    CC: good.    SUBJECTIVE:   No pain this morning.  Feeling better.  Pain started yesterday and got worse after eating last night at her mother birthday party.  No recent change in her meds.  No recent change in her ability to walk and carry out her usual activities.    ROS:  Gen: no chills or sweats  GI: no nausea or vomiting this morning; normal bm yesterday; no diarrhea or constipation  Pulm: no sob or cough or wheeze  Card: no palp recently; no recent chest pain, pressure or tightness    OBJECTIVE:  /60  Pulse 66  Temp 97.2  F (36.2  C)  Resp 20  Ht 1.6 m (5' 3\")  Wt 60.1 kg (132 lb 7.9 oz)  SpO2 99%  Breastfeeding? No  BMI 23.47 kg/m2  Vitals:        17 0001 17 0054 17 0627 17 0815   BP: 111/58 133/53 111/65 128/60   Pulse:  83 81 66   Resp:  16 16 20   Temp:  97.6  F (36.4  C) 96.8  F (36  C) 97.2  F (36.2  C)   SpO2: 98% 94% 91% 99%   Weight:  60.1 kg (132 lb 7.9 oz)     Height:  1.6 m (5' 3\")         Intake/Output Summary at --  3 Day 7AM to 7AM   Last data filed at 17 0813   Gross for 17 Gross for 17 Gross for 17   Intake      0 ml    628 ml      0 ml   Output      0 ml    225 ml    225 ml   Net      0 ml    403 ml   -225 ml     EXAM:  General Appearance: comfortable sleeping, awakens easily  Eye Exam: no icterus  OroPharynx Exam: dry; no lesions  Neck Exam: no JVD, no bruits  Chest/Respiratory Exam: CTA " bilaterally; no crackles, wheezes or rhonchi  Cardiovascular Exam: reg rate and rhythm; normal S1 and S2, no S3 or S4, no murmur or rub; no peripheral edema of her U/E's or L/E's  Gastrointestinal Exam: not distended; normal bs; no tenderness with palp in all quadrants and epigastric area  Skin Exam: pink and warm distally; dry    Recent Labs        12/08/17   0416  12/07/17 2126   WBC  7.6  8.5   HGB  12.5  14.1   MCV  85  85   PLT  215  231   INR  1.0   --      Recent Labs        12/08/17   0416  12/07/17   2126   SODIUM  141  143   POTASSIUM  4.0  4.0   CHLORIDE  106  105   [id number]  27  25   BUN  12  17   CREATININE  0.40 L  0.49 L   GLUCOSE  149 H  155 H   CALCIUM  9.0  9.3   MAGNESIUM  2.1  2.0     Recent Labs        12/08/17 0416 12/07/17 2126   ALBUMIN  3.4 L  3.6   BILITOTAL  0.3  0.3   ALT  19  22   AST  19  21   PROTEIN  4.8 L  6.2 L     ECG:  Was personally reviewed and showed: NSR, hr 79, RBBB, no Q waves, t waves upright in all leads but some terese nonspecific t wave changes in leads aVL and V2    Most recent TTE report dated 8/21/2017 reviewed and showed: Interpretation Summary  The left ventricle is borderline enlarged with normal left ventricular systolic function.  Left atrium is mildly enlarged by volume.  The left ventricular diastolic function is mildly abnormal (Grade I).  Mitral regurgitation is present, not quantified, suspect mild to moderate.  Mild tricuspid regurgitation.  Doppler findings do not suggest pulmonary hypertension.  There is no pericardial effusion.  Compared to echocardiogram dated 3/29/17, mitral regurgitation appears less prominent, although not as well visualized.    I reviewed the Cardiology clinic note by Dr Kathy Palacios dated 8/25/2017:  DISCUSSION: Jewels is doing reasonably well from a cardiovascular standpoint.She is about 7 pounds lighter than when I saw her at my last visit in April. She denies any exertional shortness of breath symptoms or extremity  swelling right now. Her transthoracic cardiogram is very encouraging with preserved LV function as well as with a lesser degree of mitral regurgitation. At this point, there is no clinical indication for mitral valve repair, percutaneous or surgical. I told her that if her mitral valve regurgitation becomes more severe, resulting in clinical decompensation, I would be happy to see her again for evaluation for transcatheter mitral valve repair. However, I suspect that the degree of mitral regurgitation that she had in the past is likely related to an acute volume overload in the setting of tachycardia with her atrial fibrillation that is now improved with rate control, sinus rhythm and diuresis.     ASSESSMENT:  Patient Active Hospital Problem List:  Cholelithiasis with acute on chronic cholelithiasis, acute, POA    Assessment: sx's of abd pain began yesterday and worsened after eating dinner last night; no pain this morning, last pain med given at 9:30 last night; normal WBC count times two; normal AST, ALT and t bile times two; CT Abd/Pelv showed: Gallbladder wall thickening with pericholecystic fluid.  Additionally there is layering gallbladder sludge or gallstones.  Findings are suggestive of acute cholecystitis.    Severe obstructive sleep apnea,chronic    Assessment: pt declines CPAP use at home    HYPERTENSION, chronic    Assessment: acutely controlled    Paroxysmal atrial fibrillation, chronic    Assessment: 12/8/2017 ECG shows NSR; chronic anticoagulation with xarelto    Diastolic heart failure, chronic    Assessment: stable; normal LVEF on TTE dated 8/21/2017    Non-rheumatic mitral regurgitation (6/22/2017)    Assessment: mild to moderate w.p TTE 8/21/2017    RBBB (right bundle branch block)    Hx of G E R D    PLAN: cont NPO; cont ivf's at 100 ml/hr; cont zofran prn and dilaudid prn; cont to hold usual xarelto, lasix and lipitor; cont metoprolol and lisinopril; will cancel TTE since she just had one  8/21/2017 and there have not been any changes in her overall pulm or card status since that time; will discuss with Dr Hayden when whe arrives.    Wander Meza MD

## 2018-02-12 NOTE — ED TRIAGE NOTES
Patient Information     Patient Name MRN Jewels Ferguson [id number] Female 1955      ED Triage Notes by Shyla Canales RN at 2017  8:17 PM     Author:  Shyla Canales RN Service:  (none) Author Type:  NURS- Registered Nurse     Filed:  2017  8:17 PM Date of Service:  2017  8:17 PM Status:  Signed     :  Shyla Canales RN (NURS- Registered Nurse)            Patient Story:    Patient presented to the ED and was accompanied by Responsible Party with Patient:: Self    Current Living Situation:: Home    Chief Complaint:   Chief Complaint    Patient presents with      Abdominal Pain       Patient Story: see triage note    Interventions/Treatments prior to arrival: to waiting room    Shyla Canales RN ....................  2017   8:17 PM

## 2018-02-12 NOTE — PROGRESS NOTES
Patient Information     Patient Name MRN Sex Jewels Valerio [id number] Female 1955      Progress Notes by Debbie Hayden DO at 2017 12:44 PM     Author:  Debbie Hayden DO  Service:  (none) Author Type:  PHYS- Osteopathic     Filed:  2017 12:52 PM  Date of Service:  2017 12:44 PM Status:  Addendum     :  Debbie Hayden DO (PHYS- Osteopathic)        Related Notes: Original Note by Debbie Hayden DO (PHYS- Osteopathic) filed at 2017 12:52 PM            [facility] General Surgery Post-Op Progress Note    CC: Cholelithiasis with acute on chronic cholecystitis    Assesment  Present on Admission:    Cholelithiasis with acute on chronic cholecystitis    HYPERTENSION, BENIGN    Calculus of gallbladder without cholecystitis without obstruction    Diastolic heart failure (HC)    Non-rheumatic mitral regurgitation    Paroxysmal atrial fibrillation (HC)    (Resolved) Pulmonary hypertension    (Resolved) Obstructive sleep apnea syndrome    G E R D    Severe obstructive sleep apnea    RBBB (right bundle branch block)                Plan:     Encourage ambulation and pulmonary toilet  Continue local wound care  Pain control measures are effective  Cl liq if she tolerates than will discharge home  Hold xaralto over the weekend   Plan surgery on  Monday  Once xaralto worn off  Discharge home on antibiotics and pain medications.  No diary or pork  Set up for surgery on Monday w/ dr villalobos         Interval History:   Doing well. Continues to improve. Pain is well-controlled. No fevers. Feels good today- no pain and wants to eat.    The pt's diet is cl liq The pt is having no nausea or vomiting. The pt has no chest pain, SOB, productive cough. There has no leg pain or swelling.     All labs, radiological & adjuvant studies, cultures and available path reports have been reviewed.  Medications and treatment regimen have been adjusted apriopriately.     Medications:       Current  "Facility-Administered Medications:      HYDROmorphone 0.4 mg injection (DILAUDID), 0.4 mg, Intravenous, q4h prn, Debbie Hayden DO     lisinopril 5 mg tablet (PRINIVIL; ZESTRIL), 5 mg, Oral, daily, Debbie Hayden, DO     metoprolol tartrate 25 mg tablet (LOPRESSOR), 25 mg, Oral, bid, Debbie Hayden, DO     naloxone 0.08 mg injection (NARCAN), 0.08 mg, Intravenous, q3min prn, Debbie Hayden,      ondansetron 4 mg injection (ZOFRAN), 4 mg, Intravenous, q6h prn, Debbie Hayden,      ondansetron 4 mg orally disintegrating tablet (ZOFRAN ODT), 4 mg, On The Tongue, q6h prn, Debbie Hayden,      pantoprazole 40 mg injection (PROTONIX), 40 mg, Intravenous, daily, Debbie Hayden,      piperacillin-tazobactam 3.375 g in D5W 50mL (ZOSYN), 3.375 g, Intravenous, q6h, Debbie Hayden, DO, Last Rate: Stopped (12/08/17 1216)     sodium chloride 0.9% 5 mL syringe (NORMAL SALINE), 5 mL, Intravenous, bid, Debbie Hayden,      sodium chloride 0.9% 5 mL syringe (NORMAL SALINE), 5 mL, Intravenous, Each Time PRN, Debbie Hayden DO     ROS  REVIEW OF SYSTEMS  GENERAL: No fevers or chills. Denies fatigue, recent weight loss.  HEENT: No sinus drainage. No changes with vision or hearing. No difficulty swallowing.     CARDIOVASCULAR: Denies chest pain, palpitations and dyspnea on exertion.  PULMONARY: No shortness of breath or cough. No increase in sputum production.  GI: Denies melena, bright red blood in stools. No hematemesis. No constipation or diarrhea. No pain today   No n/v  + appetite     SKIN: No recent rashes or ulcers.   HEMATOLOGY:  On xaralto for a fib     Musculoskeletal: no calf pain or swelling.  No le redness      Physical Exam:   All vitals have been reviewed    /57  Pulse 60  Temp 96.9  F (36.1  C)  Resp 20  Ht 1.6 m (5' 3\")  Wt 60.1 kg (132 lb 7.9 oz)  SpO2 94%  Breastfeeding? No  BMI 23.47 kg/m2    Vitals:    12/08/17 0627 12/08/17 0815 12/08/17 1010 12/08/17 1147   BP: 111/65 " 128/60 114/56 112/57   Pulse: 81 66 69 60   Resp: 16 20 18 20   Temp: 96.8  F (36  C) 97.2  F (36.2  C) 96.5  F (35.8  C) 96.9  F (36.1  C)   SpO2: 91% 99% 98% 94%   Weight:       Height:         Temp (24hrs), Av  F (36.1  C), Min:96.4  F (35.8  C), Max:97.8  F (36.6  C)    Patient Vitals for the past 72 hrs:   Weight   17 0054 60.1 kg (132 lb 7.9 oz)   17 59 kg (130 lb)          Intake/Output Summary (Last 24 hours) at 17 1245  Last data filed at 17 1235   Gross per 24 hour   Intake              628 ml   Output              750 ml   Net             -122 ml         A & O x3 in NAD  SHEENT examination revealed Mucous membranes are moist. No jaundice. Dentition is intact  No JVD. No bruising. No corneal abrasions.  Examination of the chest revealed Normal excursion. CTA b/l. No R/R/W.  Examination of the heart revealed NSR. No new murmers. No s/s of fluid overload.  Examination of the abdomen revealed no pain  + bowel sounds   The neuromuscular examination was intact. No focal deficeits. Moving ext. Independently.  No calf pain or tenderness. Distal peripheral pulses are intact.  No skin breakdown        Pt were able to express questions and concerns and were apprised of there condition and expected discharge.     Data:   All laboratory data reviewed  Recent Labs      17   WBC  7.6  8.5   HGB  12.5  14.1   MCV  85  85   PLT  215  231   INR  1.0   --      Recent Labs      17   SODIUM  141  143   POTASSIUM  4.0  4.0   CHLORIDE  106  105   [id number]  27  25   BUN  12  17   CREATININE  0.40 L  0.49 L   GLUCOSE  149 H  155 H   CALCIUM  9.0  9.3   MAGNESIUM  2.1  2.0     Recent Labs      176  17   ALBUMIN  3.4 L  3.6   BILITOTAL  0.3  0.3   ALT  19  22   AST  19  21   PROTEIN  4.8 L  6.2 L       Imaging: CT & US    Debbie Hayden, DO  2017    .

## 2018-02-12 NOTE — PROGRESS NOTES
Patient Information     Patient Name MRN Jewels Ferguson [id number] Female 1955      Progress Notes by Debbie Hayden DO at 2017 11:06 PM     Author:  Dbebie Hayden DO  Service:  (none) Author Type:  PHYS- Osteopathic     Filed:  2017 11:52 PM  Date of Service:  2017 11:06 PM Status:  Addendum     :  Debbie Hayden DO (PHYS- Osteopathic)        Related Notes: Original Note by Debbie Hayden DO (PHYS- Osteopathic) filed at 2017 11:46 PM            Surgery consults      Cardiology consult from 2017:    Kathy Palacios MD - 2017 12:00 AM CDT  [facility]    Patient Name: JEWELS MADDOX  Date of Service: 2017 : 1955 Age: 61Y Sex: F  MRN: [id number] Site MRN:   Patient Loc/Room #: HVAR/  Provider: Kathy Palacios MD, Cardiology    OFFICE NOTE    [facility] [place]    Jewels is a very pleasant 61-year-old female with a history of hypertension, hyperthyroidism, and hyperlipidemia who I saw initially 2017 when she was initially diagnosed was paroxysmal atrial fibrillation following hospitalization with A fib with RVR. She is on beta-blocker on Xarelto anticoagulation. Transthoracic echocardiogram and TTE performed showed mitral regurgitation that was at least moderate in severity. I am seeing her back in followup today, 6 months later with a followup transthoracic echocardiogram.     Jewels tells me that she feels reasonably well, does not have any shortness of breath with her level of activity at home. She walks around the house, vacuums and cleans and is not limited by shortness of breath. She denies any extremity swelling on her current medical regimen. She reports that her weight is stable; however, she has been quite bothered over the past few months by recurrent ER presentations up to 5 times in [place] with discomfort in the abdomen and has been diagnosed with gallbladder stones.  "    Of note, Jewels has established with Nica Amezcua NP, in the cardiology with [facility].    ALLERGIES: SULFA, MECLOFENAMATE, NAPROXEN.     FAMILY HISTORY: Mother had valve disease, father had myocardial infarction.     CURRENT OUTPATIENT MEDICATIONS: Include:  1. Tylenol as needed.   2. Atorvastatin 10 mg oral once a day.  3. Furosemide 20 mg oral once a day.  4. Hydrocodone-acetaminophen 5/325 mg oral once a day.  5. Lisinopril 5 mg oral once a day.  6. Metoprolol tartrate 25 mg oral twice a day.  7. Omeprazole 40 mg oral once a day.   8. Xarelto or rivaroxaban 20 mg oral once a day.     PAST MEDICAL HISTORY:  1. Gastroesophageal reflux disease.   2. Paroxysmal atrial fibrillation.  3. Mitral regurgitation.   4. Hypercholesterolemia.   5. Hyperthyroidism.   6. Obstructive sleep apnea syndrome.     PHYSICAL EXAMINATION: Vital signs - blood pressure is 106/56, heart rate 62 per minute, weight is 133.6, height 5'3\", respiratory rate 16 per minute, oxygen saturation 96% on room air. HEENT - pink conjunctivae, anicteric sclerae. Moist buccal and tongue mucosa. Vessels - JVP not elevated. No carotid pulse. No carotid bruit. Cardiac - regular rate and rhythm. S1, S2 are heard. I did not hear any murmur, gallop, or pericardial friction rub. Clear to auscultation bilaterally. No additional breath sounds. Abdomen - soft, bowel sounds positive. Has no tenderness or hepatosplenomegaly. Extremities - trace pretibial edema. Neuro - alert and oriented x3. Psychiatric - appropriate mood and affect.     INVESTIGATIONS:   1. I reviewed her transthoracic echocardiogram performed 08/21/2017, and this showed a left ventricle that is borderline enlarged, however, with normal left ventricular systolic function.   2. Mitral regurgitation is present but is probably mild to moderate. Mild tricuspid regurgitation. The mitral regurgitation severity is markedly reduced compared to what she had in the past.  3. EKG performed " today shows sinus rhythm. Heart rate is 53 beats per minute. There is left ventricular hypertrophy.    IMPRESSION/REPORT/PLAN:  1. Mitral regurgitation, mild, asymptomatic.   2. Paroxysmal atrial fibrillation.   3. Mildly dilated left ventricular with preserved systolic function.  4. Gallbladder stones.    DISCUSSION: Jewels is doing reasonably well from a cardiovascular standpoint.She is about 7 pounds lighter than when I saw her at my last visit in April. She denies any exertional shortness of breath symptoms or extremity swelling right now. Her transthoracic cardiogram is very encouraging with preserved LV function as well as with a lesser degree of mitral regurgitation. At this point, there is no clinical indication for mitral valve repair, percutaneous or surgical. I told her that if her mitral valve regurgitation becomes more severe, resulting in clinical decompensation, I would be happy to see her again for evaluation for transcatheter mitral valve repair. However, I suspect that the degree of mitral regurgitation that she had in the past is likely related to an acute volume overload in the setting of tachycardia with her atrial fibrillation that is now improved with rate control, sinus rhythm and diuresis.     Thank you for allowing me to participate in the care of Ms. Ferrer. If there are any questions, please do not hesitate to contact me.    Kathy Palacios MD  [facility] [place]  Cardiology      Past Medical History:     Diagnosis  Date     Chest pain 8/3/2004    normal sestamibi cardiac scan      Chondral defect of femoral condyle 8/22/2014     Medial meniscus tear 8/8/2014     S/P arthroscopic knee surgery 9/8/2014     Severe obstructive sleep apnea 2015    doesn't use CPAP          Past Surgical History:      Procedure  Laterality Date     APPENDECTOMY  2/1995     collar bone dislocate      surgery       COLONOSCOPY SCREENING  4/2006    Normal screening. follow-up  recommended in 10 years       DILATION AND CURETTAGE      for endometritis       KNEE ARTHROSCOPY  8/23/2007     left knee by Dr. Garcia       S/P KNEE ARTHROSCOPY Left 9/8/2014           Social History     Social History        Marital status:       Spouse name: N/A     Number of children:  N/A     Years of education:  N/A     Occupational History      Not on file.     Social History Main Topics          Smoking status:   Former Smoker      Quit date:  8/28/2004      Smokeless tobacco:   Never Used      Alcohol use   Yes      Comment: occ       Drug use:   No      Sexual activity:   Not on file      Other Topics  Concern     Not on file      Social History Narrative     Does not smoke      3 boys.  paper route                   No current facility-administered medications on file prior to encounter.      Current Outpatient Prescriptions on File Prior to Encounter       Medication  Sig Dispense Refill     acetaminophen (TYLENOL EXTRA STRENGTH) 500 mg tablet Take 1 tablet by mouth every 6 hours if needed. Max acetaminophen dose: 4000mg in 24 hrs.  0     atorvastatin (LIPITOR) 10 mg tablet Take 1 tablet by mouth at bedtime. 60 tablet 3     furosemide (LASIX) 20 mg tablet Take 1 tablet by mouth every morning. 90 tablet 3     HYDROcodone-acetaminophen, 5-325 mg, (NORCO) per tablet Take 1-2 tablets by mouth every 4 hours if needed  for Pain Max acetaminophen dose: 4000mg in 24 hrs. 15 tablet 0     lisinopril (PRINIVIL; ZESTRIL) 5 mg tablet Take 1 tablet by mouth once daily. 90 tablet 2     metoprolol tartrate (LOPRESSOR) 25 mg tablet TAKE 1 TABLET BY MOUTH TWICE DAILY 180 tablet 3     rivaroxaban (XARELTO) 20 mg tablet Take 1 tablet by mouth once daily with evening meal. 90 tablet 3       Allergies      Allergen   Reactions     Meclomen [Meclofenamate Sodium]  *Unknown     Naproxen  Nausea And Vomiting     Sulfa (Sulfonamide Antibiotics)  Rash     vomiting        Results for orders placed or performed  during the hospital encounter of 12/07/17       COMP METABOLIC PANEL       Result  Value Ref Range Status    SODIUM 143 133 - 143 mmol/L Final    POTASSIUM 4.0 3.5 - 5.1 mmol/L Final    CHLORIDE 105 98 - 107 mmol/L Final    CO2,TOTAL 25 21 - 31 mmol/L Final    ANION GAP 13 5 - 18                 Final    GLUCOSE 155 (H) 70 - 105 mg/dL Final    CALCIUM 9.3 8.6 - 10.3 mg/dL Final    BUN 17 7 - 25 mg/dL Final    CREATININE 0.49 (L) 0.70 - 1.30 mg/dL Final    BUN/CREAT RATIO           35                 Final    GFR if African American >60 >60 ml/min/1.73m2 Final    GFR if not African American >60 >60 ml/min/1.73m2 Final    ALBUMIN 3.6 3.5 - 5.7 g/dL Final    PROTEIN,TOTAL 6.2 (L) 6.4 - 8.9 g/dL Final    GLOBULIN                  2.6 2.0 - 3.7 g/dL Final    A/G RATIO 1.4 1.0 - 2.0                 Final    BILIRUBIN,TOTAL 0.3 0.3 - 1.0 mg/dL Final    ALK PHOSPHATASE 87 34 - 104 IU/L Final    ALT (SGPT) 22 7 - 52 IU/L Final    AST (SGOT) 21 13 - 39 IU/L Final   LIPASE       Result  Value Ref Range Status    LIPASE 15.5 11.0 - 82.0 IU/L Final   CBC WITH AUTO DIFFERENTIAL       Result  Value Ref Range Status    WHITE BLOOD COUNT         8.5 4.5 - 11.0 thou/cu mm Final    RED BLOOD COUNT           5.05 4.00 - 5.20 mil/cu mm Final    HEMOGLOBIN                14.1 12.0 - 16.0 g/dL Final    HEMATOCRIT                43.0 33.0 - 51.0 % Final    MCV                       85 80 - 100 fL Final    MCH                       27.9 26.0 - 34.0 pg Final    MCHC                      32.8 32.0 - 36.0 g/dL Final    RDW                       14.0 11.5 - 15.5 % Final    PLATELET COUNT            231 140 - 440 thou/cu mm Final    MPV                       9.2 6.5 - 11.0 fL Final    NEUTROPHILS               64.6 42.0 - 72.0 % Final    LYMPHOCYTES               24.0 20.0 - 44.0 % Final    MONOCYTES                 8.4 <12.0 % Final    EOSINOPHILS               2.4 <8.0 % Final    BASOPHILS                 0.4 <3.0 % Final    IMMATURE  GRANULOCYTES(METAS,MYELOS,PROS) 0.2 % Final    ABSOLUTE NEUTROPHILS      5.5 1.7 - 7.0 thou/cu mm Final    ABSOLUTE LYMPHOCYTES      2.0 0.9 - 2.9 thou/cu mm Final    ABSOLUTE MONOCYTES        0.7 <0.9 thou/cu mm Final    ABSOLUTE EOSINOPHILS      0.2 <0.5 thou/cu mm Final    ABSOLUTE BASOPHILS        0.0 <0.3 thou/cu mm Final    ABSOLUTE IMMATURE GRANULOCYTES(METAS,MYELOS,PROS) 0.0 <=0.3 thou/cu mm Final     PROCEDURES: US ABDOMEN LIMITED     HISTORY: Calculus of gallbladder with chronic cholecystitis without obstruction.     TECHNIQUE: Ultrasound of the upper abdomen was performed.     COMPARISON: CT abdomen pelvis 2017, abdominal ultrasound 2012      FINDINGS:     LIVER: The liver is normal in size and echogenicity. There is no biliary ductal dilatation.      GALLBLADDER: The gallbladder is filled with stones. No gallbladder wall thickening or pericholecystic fluid.     COMMON BILE DUCT: 3 mm     PANCREAS: The visualized portions of the pancreas are unremarkable.     KIDNEYS: The right kidney is normal in echotexture without hydronephrosis.     ABDOMINAL AORTA AND IVC: Visualized portions of the IVC and abdominal aorta are unremarkable.        IMPRESSION: Cholelithiasis.     Electronically Signed By: Chelsea Johnson M.D. on 2017 10:40 AM   Results History   US ABDOMEN LIMITED (Order#[id number])   Hard Copy Result Report   Open Hard Copy Results Report          Removed from In Basket   Done By   Ivone York MD on 2017  4:00 PM   Patient Release Status:   This result is not viewable by the patient.   Patient Information   Patient Name Sex    Jewels Ferrer ([id number]) Female 1955   Code Status     Prior     Order   US ABDOMEN LIMITED [43201.0] (Accession [id number]) (Order [id number])   Order Requisition   US ABDOMEN LIMITED (Order #[id number]) on 17   Priority and Order Details   Priority Class      Today Normal    Quantity   Ordering Quantity   1   Order Information   Date  Department Released By Authorizing   9/13/2017 [facility] Trina Rehman Ivone York MD   Order Providers   Authorizing Provider Encounter Provider Billing Provider   Ivone York [facility] US RM 2 Ivone York   Original Order   Ordered On Ordered By      9/5/2017 2:19 PM Ivone York MD         Associated Diagnoses   Calculus of gallbladder with chronic cholecystitis without obstruction [K80.10]        Clinical Indications - Free Text Clinical Indications Comment   Calculus of gallbladder with chronic cholecystitis without obstruction Received over interface ([id number])   Referral on 9/5/2017 (Closed)   ID: [id number]   Referred by Referred to   Ivone York MD Radiology - US   Priority: Routine   Type: Consult, Diagnose & Treat   Visits Requested: 1   Decision Date: 9/5/2017   Start Date: 9/5/2017    [facility]          Related Appointments   Date & Time Department/ Location Provider Reason Status Comments   09/13/2017 09/13/2017 08:30 AM [facility] US RM 2  Completed            Appointments for this Order   9/13/2017 8:30 AM - 45 min [facility] US RM 2 (Resource) [facility]   Order Questions   Question Answer Comment   Specify body part or organs Gall bladder    Encounter   View Encounter   Patient Release Status:   This result is not viewable by the patient.     CT Abdomen and Pelvis With Intravenous Contrast     CLINICAL HISTORY:    62 years  female; Signs and symptoms; Abdominal tenderness; Additional info:   Abdominal pain     TECHNIQUE:    Axial computed tomography images of the abdomen and pelvis with intravenous   contrast.  All CT scans at this facility use one or more dose reduction   techniques, viz.: automated exposure control; ma/kV adjustment per patient size   (including targeted exams where dose is matched to indication; i.e. head); or   iterative reconstruction technique.    Coronal and sagittal reformatted images were created and  reviewed.     CONTRAST:    100 mL of omnipaque administered intravenously.     COMPARISON:    CT ABDOMEN PELVIS W 2017-08-21 20:03     FINDINGS:    Lower thorax:  Scarring or subsegmental atelectasis both lung bases.      ABDOMEN:    Liver:  Minimal intra-and extrahepatic biliary ductal dilatation.  No hepatic   mass.    Gallbladder and bile ducts:  Gallbladder wall thickening with pericholecystic   fluid.  Additionally there is layering gallbladder sludge or gallstones.    Pancreas:  Unremarkable.  No mass.  No ductal dilation.    Spleen:  Unremarkable.  No splenomegaly.    Adrenals:  Unremarkable.  No mass.    Kidneys and ureters:  Multiple bilateral renal cortical cysts.  No   hydronephrosis.    Stomach and bowel:  Unremarkable.  No obstruction.  No mucosal thickening.    Appendix:  No findings to suggest acute appendicitis.      PELVIS:    Bladder:  Unremarkable.  No mass.    Reproductive:  Unremarkable as visualized.      ABDOMEN and PELVIS:    Intraperitoneal space:  Unremarkable.  No free air.  No significant fluid   collection.    Bones/joints:  Degenerative changes of the lumbar spine.  No acute fracture.    No dislocation.    Soft tissues:  Unremarkable.    Vasculature:  Unremarkable.  No abdominal aortic aneurysm.    Lymph nodes:  Unremarkable.  No enlarged lymph nodes.     IMPRESSION:            Gallbladder wall thickening with pericholecystic fluid.  Additionally there   is layering gallbladder sludge or gallstones.  Findings are suggestive of acute   cholecystitis.       Minimal intra-and extrahepatic biliary ductal dilatation.     THIS DOCUMENT HAS BEEN ELECTRONICALLY SIGNED BY ROSSANA CONTEH MD    Consult from Dr. York in 9/17:    Primary Care Physician: Brock Browne MD    HPI:   Patient is here for follow up. The patient is 62 y.o. female with previous epigastric and chest pain. She had CT that showed gallstones. She has felt better since her last visit. She hasn't had pain. She has been doing  ok with a low fat diet. She has been tolerating dairy. She had US done. She is concerned because the pharmacy wouldn't refill her Xarelto.        ASSESSMENT AND PLAN/RECOMMENDATIONS:   Gall stones-currently asymptomatic. With medical issues (anticoagualtion, severe OBSTRUCTIVE SLEEP APNEA) would recommend holding off on surgery unless her gall bladder is acting up. Discussed risks of surgery and of being off her anti-coagulation also discussed risks of recurrent right upper quadrant pain and pancreatitis. Patient in agreement with holding off on surgery. I spoke with cardiology nursing after reviewing most recent cardiology note. Recommendation for continuing Xarelto, they will refill. Patient will call with concerns otherwise will follow up AS NEEDED right upper quadrant pain, dark urine, light stools or other concerns.   Patient denies questions at this time          PAST MEDICAL HISTORY  Past Medical History:     Diagnosis  Date     Chest pain 8/3/2004     normal sestamibi cardiac scan      Chondral defect of femoral condyle 8/22/2014     Medial meniscus tear 8/8/2014     S/P arthroscopic knee surgery 9/8/2014     Severe obstructive sleep apnea 2015     doesn't use CPAP             PAST SURGICAL HISTORY  Past Surgical History:      Procedure  Laterality Date     APPENDECTOMY   2/1995     collar bone dislocate         surgery       COLONOSCOPY SCREENING   4/2006     Normal screening. follow-up recommended in 10 years       DILATION AND CURETTAGE         for endometritis       KNEE ARTHROSCOPY   8/23/2007      left knee by Dr. Garcia       S/P KNEE ARTHROSCOPY Left 9/8/2014             CURRENT MEDS  Current Outpatient Prescriptions on File Prior to Visit       Medication  Sig Dispense Refill     acetaminophen (TYLENOL EXTRA STRENGTH) 500 mg tablet Take 1 tablet by mouth every 6 hours if needed. Max acetaminophen dose: 4000mg in 24 hrs.   0     atorvastatin (LIPITOR) 10 mg tablet Take 1 tablet by mouth at bedtime.  60 tablet 3     furosemide (LASIX) 20 mg tablet Take 1 tablet by mouth every morning. 90 tablet 3     HYDROcodone-acetaminophen, 5-325 mg, (NORCO) per tablet Take 1-2 tablets by mouth every 4 hours if needed  for Pain Max acetaminophen dose: 4000mg in 24 hrs. 15 tablet 0     lisinopril (PRINIVIL; ZESTRIL) 5 mg tablet Take 1 tablet by mouth once daily. 90 tablet 2     metoprolol tartrate (LOPRESSOR) 25 mg tablet TAKE 1 TABLET BY MOUTH TWICE DAILY 180 tablet 3     omeprazole (PRILOSEC) 40 mg Delayed-Release capsule Take 1 capsule by mouth once daily. 90 capsule 4     rivaroxaban (XARELTO) 20 mg tablet Take 20 mg by mouth once daily.          No current facility-administered medications on file prior to visit.             ALLERGIES/SENSITIVITIES  Allergies      Allergen   Reactions     Meclomen [Meclofenamate Sodium]  *Unknown     Naproxen  Nausea And Vomiting     Sulfa (Sulfonamide Antibiotics)  Rash       vomiting       REVIEW OF SYSTEMS  GENERAL: No fevers or chills. Denies fatigue, recent weight loss.  HEENT: No sinus drainage. No changes with vision or hearing. No difficulty swallowing.   LYMPHATICS:  No swollen nodes in axilla, neck or groin.  CARDIOVASCULAR: Denies chest pain, palpitations and dyspnea on exertion.  PULMONARY: No shortness of breath or cough. No increase in sputum production.  GI: Denies melena, bright red blood in stools. No hematemesis. No constipation or diarrhea.  : No dysuria or hematuria.  SKIN: No recent rashes or ulcers.   HEMATOLOGY:  Has easy bruising and bleeding.  ENDOCRINE:  No history of diabetes or thyroid problems.  NEUROLOGY:  No history of seizures or headaches. No motor or sensory changes.     PHYSICAL EXAM  /70  Pulse 60  Wt 60.1 kg (132 lb 6.4 oz)  Breastfeeding? No  BMI 23.45 kg/m2  GENERAL: chronically ill appearing patient in no acute distress. Pleasant and cooperative with exam and interview.   HEENT: Head-normocephalic. Eyes-no scleral icterus. Nose-no nasal  drainage. No lesions. Mouth-oral mucosa pink and moist, no lesions.  NECK: Supple. No thyroid nodules. Trachea midline.  LYMPHATICS:  No cervical, axillary or supraclavicular adenopathy.  CV: irregular rate and rhythm. No peripheral edema.  LUNGS:  No respiratory distress. Clear bilaterally to auscultation.  ABDOMEN: Non distended. Bowel sounds active. Soft, non-tender, no hepatosplenomegaly or hernias. No peritoneal signs.  SKIN: Pink, warm and dry. No jaundice. No rash.  NEURO:  Cranial nerves II-XII grossly intact. Alert and oriented.  PSYCH: Appropriate mood and flat affect.     IMAGING/LAB  I personally reviewed patient's previous labs and US images and report-+ stones, no pericholecystic fluid or common bile duct dilation.  Reviewed LFT today-normal AST, ALT and Alkaline phosphatase. Normal bilirubin.  Ivone York MD              Patient has failed outpt conservative management of gallstones  Review of cardiology consult does not seem prohibited to having her gallbladder done at [facility].  We will admit her for IV antibiotics and pain control.  She will need to wait 48 hours for the xarelto to be out of her system.    Conservative care    Debbie Hayden DO

## 2018-02-12 NOTE — PROGRESS NOTES
Patient Information     Patient Name MRN Sex Jewels Valerio [id number] Female 1955      Progress Notes by Prabha Brownlee RN at 2017 12:50 AM     Author:  Prabha Brownlee RN Service:  (none) Author Type:  NURS- Registered Nurse     Filed:  2017 12:51 AM Date of Service:  2017 12:50 AM Status:  Signed     :  Prabha Brownlee RN (NURS- Registered Nurse)            Admission Note    Data:  Jewels Ferrer admitted to [facility] from emergency department via bed.      Action:  Family and Dr. Hayden have been notified of admission.      Response:  Patient tolerated transfer.     Prabha Brownlee RN ....................  2017   12:51 AM

## 2018-02-12 NOTE — PROGRESS NOTES
Patient Information     Patient Name MRN Sex Jewels Valerio [id number] Female 1955      Progress Notes by Kelsea Turner RT at 2017  9:03 AM     Author:  Kelsea Turner RT Service:  (none) Author Type:  RT- Respiratory Therapist     Filed:  2017  9:04 AM Date of Service:  2017  9:03 AM Status:  Signed     :  Kelsea Turner RT (RT- Respiratory Therapist)            RCAT completed with pt score of 1. No respiratory recommendations at this time. RT Zee

## 2018-02-12 NOTE — PROGRESS NOTES
Patient Information     Patient Name MRN Jewels Ferguson [id number] Female 1955      Progress Notes by Dara Hammond at 2017 10:07 PM     Author:  Dara Hammond Service:  (none) Author Type:  Other Clinical Staff     Filed:  2017 10:07 PM Date of Service:  2017 10:07 PM Status:  Signed     :  Dara Hammond (Other Clinical Staff)            IV Contrast- Discharge Instructions After Your CT Scan      The IV contrast you received today will be filtered from your bloodstream by your kidneys during the next 24 hours and pass from the body in urine.  You will not be aware of this process and your urine will not change in color.  To help this process you should drink at least 4 additional glasses of water or juice today.  This reduces stress on your kidneys.    Most contrast reactions are immediate.  Should you develop symptoms of concern after discharge, contact the department at the number below.  After hours you should contact your personal physician.  If you develop breathing distress or wheezing, call 911.

## 2018-02-12 NOTE — PROGRESS NOTES
Patient Information     Patient Name MRN Jewels Ferguson [id number] Female 1955      Progress Notes by Zulma Sarabia RN at 2017 10:55 PM     Author:  Zulma Sarabia RN Service:  (none) Author Type:  NURS- Registered Nurse     Filed:  2017 10:59 PM Date of Service:  2017 10:55 PM Status:  Signed     :  Zulma Sarabia RN (NURS- Registered Nurse)            Problem: PAIN  Goal: VERBALIZES/DISPLAYS ADEQUATE COMFORT LEVEL OR BASELINE COMFORT LEVEL  Outcome: Problem reviewed and goal still appropriate  Patient denies any new pain, only tenderness in the abdomen at this time.     Problem: RESPIRATORY  Goal: MAINTAIN OXYGEN LEVELS PER PROVIDER ORDERS  Outcome: Problem reviewed and goal still appropriate  Patient is wearing oxygen at 2 L via NC at HS with sats between 93-96%. Pt. refuses to use CPAP machine for sleep apnea.     Problem: GASTROINTESTINAL  Goal: MAINTAIN OR PROMOTE RETURN TO OPTIMAL GI FUNCTION  Outcome: Problem reviewed and goal still appropriate  Patient tolerating plain water, does not want any other liquids at this time.

## 2018-02-12 NOTE — PROGRESS NOTES
Patient Information     Patient Name MRN Jewels Ferguson [id number] Female 1955      Progress Notes by Prabha Brownlee RN at 2017 12:11 AM     Author:  Prabha Brownlee RN Service:  (none) Author Type:  NURS- Registered Nurse     Filed:  2017 12:11 AM Date of Service:  2017 12:11 AM Status:  Signed     :  Prabha Brownlee RN (NURS- Registered Nurse)            Nurse to nurse from MIAN Agudelo. All questions answered. Awaiting transfer from ER. Prabha Brownlee RN ....................  2017   12:11 AM

## 2018-02-12 NOTE — ED NOTES
Patient Information     Patient Name MRN Jewels Ferguson [id number] Female 1955      ED Nursing Note by Jammie Palomino RN at 2017 10:41 PM     Author:  Jammie Palomino RN Service:  (none) Author Type:  NURS- Registered Nurse     Filed:  2017 10:43 PM Date of Service:  2017 10:41 PM Status:  Signed     :  Jammie Palomino RN (NURS- Registered Nurse)            Patient placed on oxygen 2L via NC d/t Sp02 continues to drop to mid 80s, patient is dozing off and has sleep apnea.  Patient's  states that she is supposed to wear CPAP but does not at home.  Sp02 up to 95-99% on 2L.

## 2018-02-12 NOTE — PROGRESS NOTES
Patient Information     Patient Name MRJewels Corrales [id number] Female 1955      Progress Notes by Michoacano Sylvester PharmD at 2017  2:05 PM     Author:  Michoacano Sylvester PharmD Service:  (none) Author Type:  PHARM- Pharmacist     Filed:  2017  2:05 PM Date of Service:  2017  2:05 PM Status:  Signed     :  Michoacano Sylvester PharmD (PHARM- Pharmacist)            Pharmacy: Discharge Counseling and Medication Reconciliation    Jewels Ferrer  [address] [facility] [address]    Home Phone [phone]   Work Phone Not on file.   Mobile [phone]     62 y.o. female  PCP:Brock Browne MD    Allergies      Allergen   Reactions     Meclomen [Meclofenamate Sodium]  *Unknown     Naproxen  Nausea And Vomiting and *Unknown - Pt Doesn't Remember     Sulfa (Sulfonamide Antibiotics)  Rash     vomiting        Discharge Counseling:    Pharmacist met with patient (and/or family) today to review the medication portion of the After Visit Summary (with an emphasis on NEW medications) and to address patient's questions/concerns.     Summary of Education: PharmD met with patient to discuss new medications after discharge. Reviewed indication, expected duration, potential side effects and proper use of each. Counseled patient to hold Xarelto until after surgery.     Materials Provided:   MedCounselor sheets printed from Clinical Pharmacology on: No new meds.     Discharge Medication Reconciliation:    Michoacano Sylvester PharmD has reviewed the patient's discharge medication orders and has compared them to the inpatient medication administration record and to what the patient was taking prior to admission- any discrepancies have been resolved.     It has been determined that the patient has an adequate supply of medications available or which can be obtained from the patient's preferred pharmacy.    Thank you for the consult.     Michoacano Sylvester PharmD ....................  2017   2:05  PM

## 2018-02-13 NOTE — PROGRESS NOTES
Patient Information     Patient Name MRN Sex Jewels Valerio [id number] Female 1955      Progress Notes by Eric Lee MD at 2017 11:15 AM     Author:  Eric Lee MD Service:  (none) Author Type:  Physician     Filed:  2017 11:35 AM Encounter Date:  2017 Status:  Signed     :  Eric Lee MD (Physician)            SUBJECTIVE:    Jewels Ferrer is a 62 y.o. female who presents for hospital follow up     HPI    Had a cholecystectomy recently, with significant hypoxia after.  Ultimately was admitted to the hospital, with severe sleep apnea noted.  Patient was sent to the [facility] for rehabilitation, discharged this AM.  On her way home.  Currently only complains of right upper quadrant pain.  Just started last night.  Tylenol resolved it.  Worse with a deep breath.  Says it is mild today.  No cough.  No leg or calf pain.  Has never had an overnight sleep study.  Says she has too much anxiety to even think about CPAP.  Had BiPAP in the hospital and she could not tolerate this at all.    Allergies      Allergen   Reactions     Meclomen [Meclofenamate Sodium]  *Unknown     Naproxen  Nausea And Vomiting and *Unknown - Pt Doesn't Remember     Sulfa (Sulfonamide Antibiotics)  Rash     vomiting    ,   Current Outpatient Prescriptions on File Prior to Visit       Medication  Sig Dispense Refill     acetaminophen (TYLENOL EXTRA STRENGTH) 500 mg tablet Take 1-2 tablets by mouth every 6 hours if needed. Max acetaminophen dose: 4000mg in 24 hrs.  0     atorvastatin (LIPITOR) 10 mg tablet Take 1 tablet by mouth at bedtime. 60 tablet 3     furosemide (LASIX) 20 mg tablet Take 1 tablet by mouth every morning. 90 tablet 3     lisinopril (PRINIVIL; ZESTRIL) 5 mg tablet Take 1 tablet by mouth once daily. 90 tablet 2     metoprolol tartrate (LOPRESSOR) 25 mg tablet TAKE 1 TABLET BY MOUTH TWICE DAILY 180 tablet 3     oxyCODONE-acetaminophen, 5-325 mg, (PERCOCET) 5-325 mg per tablet Take 1  tablet by mouth every 4 hours if needed  for Pain Max acetaminophen dose: 4000mg in 24 hrs. 30 tablet 0     No current facility-administered medications on file prior to visit.    ,   Past Medical History:     Diagnosis  Date     Chest pain 8/3/2004    normal sestamibi cardiac scan      Cholelithiases 2017    US done in [place]      Chondral defect of femoral condyle 8/22/2014     Diastolic heart failure (HC) 07/27/2017    normal LVEF s/p TTE 8/21/2017; Dr Palacios, [facility] last seen 8/25/2017      HEADACHE, CHRONIC 6/6/2012     HYPERTENSION, BENIGN 9/7/2011     Medial meniscus tear 8/8/2014     Non-rheumatic mitral regurgitation 06/22/2017    mild to moderate s/p TTE 8/21/2017      Paroxysmal atrial fibrillation (HC) 07/27/2017    chronic xarelto      S/P arthroscopic knee surgery 9/8/2014     Severe obstructive sleep apnea 2015    doesn't use CPAP      UTI'S, RECURRENT     and   Past Surgical History:      Procedure  Laterality Date     APPENDECTOMY  2/1995     collar bone dislocate      surgery       COLONOSCOPY SCREENING  4/2006    Normal screening. follow-up recommended in 10 years       DILATION AND CURETTAGE      for endometritis       KNEE ARTHROSCOPY  8/23/2007     left knee by Dr. Garcia       S/P KNEE ARTHROSCOPY Left 9/8/2014       REVIEW OF SYSTEMS:  Review of Systems   Constitutional: Negative for chills and fever.   Respiratory: Negative for cough and shortness of breath.    Gastrointestinal: Positive for abdominal pain. Negative for nausea and vomiting.   Psychiatric/Behavioral: The patient is nervous/anxious. The patient does not have insomnia.        OBJECTIVE:  /64 (Cuff Site: Right Arm, Position: Sitting, Cuff Size: Adult Regular)  Pulse 68  Wt 64.4 kg (142 lb)  BMI 25.15 kg/m2    EXAM:   Physical Exam   Constitutional: She is oriented to person, place, and time and well-developed, well-nourished, and in no distress. No distress.   HENT:   Head: Normocephalic and atraumatic.    Cardiovascular: Normal rate, regular rhythm and normal heart sounds.  Exam reveals no gallop and no friction rub.    No murmur heard.  Pulmonary/Chest: Effort normal. No respiratory distress. She has no wheezes. She has no rales.   Abdominal:   Mild right upper quadrant pain on palpitation.  Soft, not distended.   Neurological: She is alert and oriented to person, place, and time.   Skin: She is not diaphoretic.   Neg Shira's.    ASSESSMENT/PLAN:    ICD-10-CM    1. Sleep apnea, unspecified type G47.30    2. Paroxysmal atrial fibrillation (HC) I48.0    3. Post-operative pain G89.18         Plan:  She really desires little to no interventions.  Appears to be competent to decline treatment.  I did offer her a consult with a sleep specialist, but she does not want this currently.  The abdomen exam is within a normal range for a recent cholecystectomy, just mild right upper quadrant pain.  No fevers, no tachycardia so doubt post operative infection or a PULMONARY EMBOLISM.  Follow up as needed.    Eric Lee MD ....................  12/21/2017   11:35 AM

## 2018-02-13 NOTE — NURSING NOTE
Patient Information     Patient Name MRN Jewels Ferguson [id number] Female 1955      Nursing Note by Martín Arvizu at 2017  9:30 AM     Author:  Martín Arvizu Service:  (none) Author Type:  (none)     Filed:  2017  9:37 AM Encounter Date:  2017 Status:  Signed     :  Martín Arvizu            Patient presents to the clinic today for her 2 week post-op gallbladder removal visit. Patient aware that she is due for a mammogram and colonoscopy.     Martín Arvizu ....................  2017   9:33 AM

## 2018-02-13 NOTE — PROGRESS NOTES
Patient Information     Patient Name MRN Sex Jeewls Valerio [id number] Female 1955      Progress Notes by Rai Arvizu MD at 2017  9:30 AM     Author:  Rai Arvizu MD Service:  (none) Author Type:  Physician     Filed:  2018  8:52 AM Encounter Date:  2017 Status:  Signed     :  Rai Arvizu MD (Physician)            Patient presents for post surgical visit after lap zeyad 2 weeks ago. Patient has done well. No problems with incisions.    Breastfeeding? No    General: NAD, pleasant and cooperative with exam and interview.  Abdomen: healing incisions. No sign of infection. No pain with palpation.  Psychiatry: awake, alert and oriented. Appropriate affect.    Assessment/Plan:    Discussed surgery and pathology results. Patient can return to normal activities. Patient will call with questions or concerns.    Rai Arvizu MD ....................  2017   9:39 AM

## 2018-02-13 NOTE — NURSING NOTE
"Patient Information     Patient Name Jewels Perdomo [id number] Female 1955      Nursing Note by Pat Arora at 2017 11:15 AM     Author:  Pat Arora Service:  (none) Author Type:  (none)     Filed:  2017 11:27 AM Encounter Date:  2017 Status:  Signed     :  Pat Arora            Patient presents today to follow up from her recent hospital stays and her discharge from [facility]. When patient was asked why she was here she stated,\"I was just told I had to come in and see my doctor.\"  Pat Arora LPN .............2017  11:05 AM          "

## 2018-04-04 DIAGNOSIS — I27.20 PULMONARY HYPERTENSION (H): Primary | ICD-10-CM

## 2018-04-05 DIAGNOSIS — E78.00 HYPERCHOLESTEROLEMIA: Primary | ICD-10-CM

## 2018-04-05 RX ORDER — LISINOPRIL 5 MG/1
TABLET ORAL
Qty: 90 TABLET | Refills: 3 | Status: SHIPPED | OUTPATIENT
Start: 2018-04-05 | End: 2018-01-01

## 2018-04-05 RX ORDER — ATORVASTATIN CALCIUM 10 MG/1
10 TABLET, FILM COATED ORAL AT BEDTIME
Qty: 90 TABLET | Refills: 1 | Status: SHIPPED | OUTPATIENT
Start: 2018-04-05 | End: 2018-01-01

## 2018-04-05 NOTE — TELEPHONE ENCOUNTER
Please note:    Michael shows pulmonary hypertension as the associated dx for lisinopril. HTN, benign is the type listed on Patient's problem list in Epic. Medication mirna'd up with pulmonary HTN.    Chelsea Perez RN .............. 4/5/2018  10:12 AM

## 2018-04-05 NOTE — TELEPHONE ENCOUNTER
Last OV with Nica Amezcua, GRANT CNP 8/29/17    Prescription approved per [facility] Refill Protocol.  Chelsea Chandra RN............................ 4/5/2018 10:45 AM

## 2018-04-23 ENCOUNTER — HOSPITAL ENCOUNTER (EMERGENCY)
Facility: OTHER | Age: 63
Discharge: HOME | End: 2018-04-23
Attending: FAMILY MEDICINE | Admitting: FAMILY MEDICINE
Payer: COMMERCIAL

## 2018-04-23 ENCOUNTER — APPOINTMENT (OUTPATIENT)
Dept: CT IMAGING | Facility: OTHER | Age: 63
End: 2018-04-23
Attending: FAMILY MEDICINE
Payer: COMMERCIAL

## 2018-04-23 ENCOUNTER — APPOINTMENT (OUTPATIENT)
Dept: GENERAL RADIOLOGY | Facility: OTHER | Age: 63
End: 2018-04-23
Attending: FAMILY MEDICINE
Payer: COMMERCIAL

## 2018-04-23 VITALS
DIASTOLIC BLOOD PRESSURE: 62 MMHG | BODY MASS INDEX: 22.5 KG/M2 | HEART RATE: 73 BPM | TEMPERATURE: 97.3 F | RESPIRATION RATE: 18 BRPM | OXYGEN SATURATION: 98 % | HEIGHT: 63 IN | WEIGHT: 127 LBS | SYSTOLIC BLOOD PRESSURE: 152 MMHG

## 2018-04-23 DIAGNOSIS — M79.671 PAIN OF MIDFOOT, RIGHT: ICD-10-CM

## 2018-04-23 PROCEDURE — 99283 EMERGENCY DEPT VISIT LOW MDM: CPT | Mod: Z6 | Performed by: FAMILY MEDICINE

## 2018-04-23 PROCEDURE — 25000132 ZZH RX MED GY IP 250 OP 250 PS 637: Performed by: FAMILY MEDICINE

## 2018-04-23 PROCEDURE — 73700 CT LOWER EXTREMITY W/O DYE: CPT | Mod: RT

## 2018-04-23 PROCEDURE — 73620 X-RAY EXAM OF FOOT: CPT | Mod: RT

## 2018-04-23 PROCEDURE — 99284 EMERGENCY DEPT VISIT MOD MDM: CPT | Mod: 25 | Performed by: FAMILY MEDICINE

## 2018-04-23 RX ORDER — ACETAMINOPHEN 325 MG/1
650 TABLET ORAL ONCE
Status: COMPLETED | OUTPATIENT
Start: 2018-04-23 | End: 2018-04-23

## 2018-04-23 RX ADMIN — ACETAMINOPHEN 650 MG: 325 TABLET, FILM COATED ORAL at 08:28

## 2018-04-23 ASSESSMENT — ENCOUNTER SYMPTOMS
WOUND: 0
COLOR CHANGE: 0
FEVER: 0
JOINT SWELLING: 1
LIGHT-HEADEDNESS: 0

## 2018-04-23 NOTE — ED PROVIDER NOTES
History     Chief Complaint   Patient presents with     Foot Pain     HPI  Jewels Ferrer is a 62 year old female who presents to the ED after tripping while getting out of a car yesterday. She inverted her foot and rolled her ankle. She did not fall. Has used ice packs yesterday for a little bit and took tylenol this morning at 0400. She has been non-weight bearing since the incident.     Problem List:    Patient Active Problem List    Diagnosis Date Noted     Hypercholesterolemia 01/30/2018     Priority: Medium     S/P laparoscopic cholecystectomy 01/02/2018     Priority: Medium     Acute respiratory failure with hypoxia and hypercarbia (H) 12/11/2017     Priority: Medium     Cholelithiases 12/08/2017     Priority: Medium     RBBB (right bundle branch block) 12/08/2017     Priority: Medium     Cholelithiasis with acute on chronic cholecystitis 12/07/2017     Priority: Medium     Calculus of gallbladder without cholecystitis without obstruction 09/01/2017     Priority: Medium     Diastolic heart failure (H) 07/27/2017     Priority: Medium     Overview:   normal LVEF s/p TTE 8/21/2017; Dr Palacios, [facility] last seen 8/25/2017       Paroxysmal atrial fibrillation (H) 07/27/2017     Priority: Medium     Overview:   chronic xarelto       Non-rheumatic mitral regurgitation 06/22/2017     Priority: Medium     Overview:   mild to moderate s/p TTE 8/21/2017       Severe obstructive sleep apnea 01/01/2015     Priority: Medium     Overview:   doesn't use CPAP       S/P arthroscopic knee surgery 09/08/2014     Priority: Medium     Left knee DJD 07/31/2014     Priority: Medium     Esophageal reflux 06/06/2012     Priority: Medium     Hypertension, benign 09/07/2011     Priority: Medium        Past Medical History:    Past Medical History:   Diagnosis Date     Acquired absence of other specified parts of digestive tract      Calculus of gallbladder without cholecystitis without obstruction      Chest pain      Diastolic  "congestive heart failure (H)      Essential (primary) hypertension      Headache      Nonrheumatic mitral valve insufficiency      Obstructive sleep apnea      Other internal derangements of unspecified knee      Other specified postprocedural states      Other tear of medial meniscus, current injury, unspecified knee, initial encounter      Paroxysmal atrial fibrillation (H)      Urinary tract infection        Past Surgical History:    Past Surgical History:   Procedure Laterality Date     APPENDECTOMY OPEN      2/1995     ARTHROSCOPY KNEE      8/23/2007, left knee by Dr. Garcia     COLONOSCOPY      4/2006,Normal screening. follow-up recommended in 10 years     DILATION AND CURETTAGE      for endometritis     OTHER SURGICAL HISTORY      [id number],OTHER,surgery     OTHER SURGICAL HISTORY      9/8/2014,468148,OTHER,Left       Family History:    Family History   Problem Relation Age of Onset     Other - See Comments Mother      Bronchiectasis/Chronic headaches     Hypertension Father      Hypertension     DIABETES Father      Diabetes     HEART DISEASE Father      Heart Disease,MI     Other - See Comments Sister      Chronic headaches       Social History:  Marital Status:   [2]  Social History   Substance Use Topics     Smoking status: Never Smoker     Smokeless tobacco: Never Used     Alcohol use Yes      Comment: Alcoholic Drinks/day: \"glass of wine once or twice a year\"        Medications:      acetaminophen (TYLENOL) 500 MG tablet   atorvastatin (LIPITOR) 10 MG tablet   furosemide (LASIX) 20 MG tablet   lisinopril (PRINIVIL/ZESTRIL) 5 MG tablet   metoprolol tartrate (LOPRESSOR) 25 MG tablet   rivaroxaban ANTICOAGULANT (XARELTO) 20 MG TABS tablet     Review of Systems   Constitutional: Negative for fever.   Musculoskeletal: Positive for joint swelling.        Right foot pain   Skin: Negative for color change, rash and wound.   Neurological: Negative for syncope and light-headedness.     Physical Exam   BP: " "152/62  Pulse: 73  Temp: 97.3  F (36.3  C)  Resp: 18  Height: 160 cm (5' 3\")  Weight: 57.6 kg (127 lb)  SpO2: 98 %    Physical Exam   Constitutional: She is oriented to person, place, and time. She appears well-developed and well-nourished. No distress.   Cardiovascular: Normal rate, regular rhythm and normal heart sounds.  Exam reveals no gallop and no friction rub.    No murmur heard.  Pulmonary/Chest: Effort normal and breath sounds normal. She has no wheezes. She has no rales.   Musculoskeletal: She exhibits tenderness. She exhibits no deformity.   Minimal edema in right foot. No ecchymosis. Tenderness along the lateral side of the foot. Point tenderness on the distal end of the 5th metatarsal and 4th metatarsal. No tenderness on the lateral malleolus. No tenderness by the fibular head. ROM is fairly limited from pain but is able to rotate affectively.    Neurological: She is alert and oriented to person, place, and time.   Skin: She is not diaphoretic.   Psychiatric: She has a normal mood and affect. Her behavior is normal.     Results for orders placed or performed during the hospital encounter of 04/23/18   XR Foot Right 2 Views    Narrative    PROCEDURE:  XR FOOT RT 2 VW    HISTORY: inversion injury, pain over the base of the 5th metatarsal; .    COMPARISON:  None.    TECHNIQUE:  3 views right foot.    FINDINGS:  No fracture or dislocation is identified. The joint spaces  are preserved. No foreign body is seen.       Impression    IMPRESSION: No acute fracture.      JB CAMPBELL MD   CT Foot Right w/o Contrast    Narrative    CT FOOT RIGHT W/O CONTRAST    HISTORY: fall, moderate suspicion for occult fracture;   nonweightbearing due to pain over the low lateral base of the fifth  metatarsal.    COMPARISON: Radiographs earlier today.    TECHNIQUE: Helical noncontrast CT images of the right foot.    FINDINGS:    The bones are osteopenic/osteoporotic, which limits assessment for  nondisplaced fracture. " MRI has the highest sensitivity for  nondisplaced fracture in osteopenic/osteoporotic patients due to  highlighting of edema. CT is most useful for characterizing complex  fractures.    No convincing evidence of acute fracture is identified. Mild  osteophytes are present at the posterior superior aspect of the fifth  metatarsal where it articulates with the fourth metatarsal base.  Scattered osteoarthritic changes are minimal, best seen at the first  MTP joint. No acute ankle fracture is identified. The talar dome is  intact. The navicular is intact. The cuboid is intact.      Impression    IMPRESSION:     No evidence of an acute fracture.  If concern persists, consider  follow-up MR or delayed radiographs.    JB CAMPBELL MD       ED Course     ED Course     Procedures    No results found for this or any previous visit (from the past 24 hour(s)).    Medications   acetaminophen (TYLENOL) tablet 650 mg (650 mg Oral Given 4/23/18 0828)       Assessments & Plan (with Medical Decision Making)   Jewels Ferrer is a 62 year old female who presented today for an acute inversion after tripping while getting out of a vehicle. P.E. is concerning for either a fracture or a sprain. XR of the right foot was negative for acute fracture, still have a moderate suspicion for an occult fracture d/t exam findings. CT was also negative for any acute findings. Most likely this is a ligament sprain. Suspicion for a lisfranc injury is low but not zero. Will treat her with a short cam walker and crutches and have her follow up with ortho in the future. Discussed scheduled tylenol dosing for a few days and to ice at least 4 times a day.       Note started by MS3 RPAP,  I revised, edited and addended note as needed.  In addition patient was seen and examined by myself including both history and physical examination. My findings are reflected in the note above.        Discharge Medication List as of 4/23/2018 10:20 AM          Final  diagnoses:   Pain of midfoot, right       4/23/2018   [facility]     Alli Patel MD  04/25/18 3160

## 2018-04-23 NOTE — ED AVS SNAPSHOT
[facility]    [address]    [place] [address]    Phone:  [phone]    Fax:  [phone]                                       Jewels Ferrer   MRN: [id number]    Department:  [facility]   Date of Visit:  4/23/2018           Patient Information     Date Of Birth          1955        Your diagnoses for this visit were:     Pain of midfoot, right        You were seen by Alli Patel MD.      Follow-up Information     Follow up with Brock Browne MD.    Specialty:  Family Practice    Why:  If symptoms worsen    Contact information:    [address]  [place] [id number] [phone]          Follow up with Earle Thomas MD In 1 week.    Specialty:  Family Practice    Contact information:    [address]  [place] [address] [phone]          Discharge Instructions         Bent-Knee C    24 Hour Appointment Hotline       To make an appointment at any [facility] [place], call [phone] ([phone]). If you don't have a family doctor or clinic, we will help you find one. [facility] clinics are conveniently located to serve the needs of you and your family.          ED Discharge Orders     Crutches       Use gait belt during crutch training.            ORTHOPEDICS ADULT REFERRAL       Non-surgical Ortho F/U may see Dr William VALDES                    Review of your medicines      Our records show that you are taking the medicines listed below. If these are incorrect, please call your family doctor or clinic.        Dose / Directions Last dose taken    acetaminophen 500 MG tablet   Commonly known as:  TYLENOL   Dose:  500-1000 mg        Take 500-1,000 mg by mouth every 6 hours as needed Max acetaminophen dose 4000mg in 24 hrs   Refills:  0        atorvastatin 10 MG tablet   Commonly known as:  LIPITOR   Dose:  10 mg   Quantity:  90 tablet        Take 1 tablet (10 mg) by mouth At Bedtime   Refills:   "1        furosemide 20 MG tablet   Commonly known as:  LASIX   Dose:  20 mg        Take 20 mg by mouth every morning   Refills:  0        lisinopril 5 MG tablet   Commonly known as:  PRINIVIL/ZESTRIL   Quantity:  90 tablet        TAKE 1 TABLET BY MOUTH EVERY DAY   Refills:  3        metoprolol tartrate 25 MG tablet   Commonly known as:  LOPRESSOR   Dose:  25 mg        Take 25 mg by mouth 2 times daily   Refills:  0        XARELTO 20 MG Tabs tablet   Dose:  20 mg   Generic drug:  rivaroxaban ANTICOAGULANT        Take 20 mg by mouth daily (with dinner)   Refills:  0                Procedures and tests performed during your visit     CT Foot Right w/o Contrast    XR Foot Right 2 Views      Orders Needing Specimen Collection     None      Pending Results     No orders found from 4/21/2018 to 4/24/2018.            Pending Culture Results     No orders found from 4/21/2018 to 4/24/2018.            Pending Results Instructions     If you had any lab results that were not finalized at the time of your Discharge, you can call the ED Lab Result RN at [phone]. You will be contacted by this team for any positive Lab results or changes in treatment. The nurses are available 7 days a week from 10A to 6:30P.  You can leave a message 24 hours per day and they will return your call.        Thank you for choosing [facility]       Thank you for choosing [facility] for your care. Our goal is always to provide you with excellent care. Hearing back from our patients is one way we can continue to improve our services. Please take a few minutes to complete the written survey that you may receive in the mail after you visit with us. Thank you!        [employer] Information     [employer] lets you send messages to your doctor, view your test results, renew your prescriptions, schedule appointments and more. To sign up, go to [email or web address] . Click on \"Log in\" on the left side of the screen, which will take you to the Welcome page. Then " "click on \"Sign up Now\" on the right side of the page.     You will be asked to enter the access code listed below, as well as some personal information. Please follow the directions to create your username and password.     Your access code is: [email or web address]  Expires: 2018 10:20 AM     Your access code will  in 90 days. If you need help or a new code, please call your [place] clinic or [phone].        Care EveryWhere ID     This is your Care EveryWhere ID. This could be used by other organizations to access your [place] medical records  [id number]        Equal Access to Services     [facility]: Lissette Garcia, benito christensen, jamee gardiner, sterling west . So [facility] [phone].    ATENCIÓN: Si habla español, tiene a ospina disposición servicios gratuitos de asistencia lingüística. Llame al [phone].    We comply with applicable federal civil rights laws and [place] laws. We do not discriminate on the basis of race, color, national origin, age, disability, sex, sexual orientation, or gender identity.            After Visit Summary       This is your record. Keep this with you and show to your community pharmacist(s) and doctor(s) at your next visit.                  "

## 2018-04-23 NOTE — ED TRIAGE NOTES
Pt here from home with complaint of fall yesterday.  Pt fell and complains of pain to top of right foot.  Pt unable to bear weight and requires 2 assist to move from wheelchair to cot.

## 2018-05-10 NOTE — TELEPHONE ENCOUNTER
Patient Information     Patient Name MRJewels Corrales [id number] Female 1955      Telephone Encounter by Gosselin, Norma J at 2017  2:35 PM     Author:  Gosselin, Norma J Service:  (none) Author Type:  (none)     Filed:  2017  2:48 PM Encounter Date:  2017 Status:  Signed     :  Gosselin, Norma J            According to her discharge paper from [facility] 3/29/17. She is taking Metoprolol Tartrate 25mg BID for 90 days.. She is out of this medication. Does she need to continue this medication?  She has appointment with Cardiology at [facility].  Norma J Gosselin LPN....................  2017   2:48 PM            
Patient Information     Patient Name MRN Jewels Ferguson [id number] Female 1955      Telephone Encounter by Gosselin, Norma J at 2017  3:46 PM     Author:  Gosselin, Norma J Service:  (none) Author Type:  (none)     Filed:  2017  3:46 PM Encounter Date:  2017 Status:  Signed     :  Gosselin, Norma J            Patient notified of medication refill to [facility]. Norma J Gosselin LPN....................  2017   3:46 PM            
3

## 2018-07-31 ENCOUNTER — OFFICE VISIT (OUTPATIENT)
Dept: OTOLARYNGOLOGY | Facility: OTHER | Age: 63
End: 2018-07-31
Attending: OTOLARYNGOLOGY
Payer: COMMERCIAL

## 2018-07-31 DIAGNOSIS — H90.3 SENSORY HEARING LOSS, BILATERAL: Primary | ICD-10-CM

## 2018-07-31 NOTE — MR AVS SNAPSHOT
"              After Visit Summary   2018    Jewels Ferrer    MRN: [id number]           Patient Information     Date Of Birth          1955        Visit Information        Provider Department      2018 11:10 AM Clifford Stephens MD [facility]        Today's Diagnoses     Sensory hearing loss, bilateral    -  1       Follow-ups after your visit        Who to contact     If you have questions or need follow up information about today's clinic visit or your schedule please contact [facility] directly at [phone].  Normal or non-critical lab and imaging results will be communicated to you by [email or web address], letter or phone within 4 business days after the clinic has received the results. If you do not hear from us within 7 days, please contact the clinic through Omn[employer] or phone. If you have a critical or abnormal lab result, we will notify you by phone as soon as possible.  Submit refill requests through [employer] or call your pharmacy and they will forward the refill request to us. Please allow 3 business days for your refill to be completed.          Additional Information About Your Visit        MyChart Information     [employer] lets you send messages to your doctor, view your test results, renew your prescriptions, schedule appointments and more. To sign up, go to [email or web address] . Click on \"Log in\" on the left side of the screen, which will take you to the Welcome page. Then click on \"Sign up Now\" on the right side of the page.     You will be asked to enter the access code listed below, as well as some personal information. Please follow the directions to create your username and password.     Your access code is: [email or web address]  Expires: 2018  9:25 AM     Your access code will  in 90 days. If you need help or a new code, please call your [place] clinic or [phone].        Care EveryWhere ID     This is your Care EveryWhere ID. This " could be used by other organizations to access your [place] medical records  [id number]         Blood Pressure from Last 3 Encounters:   04/23/18 152/62   12/27/17 118/68   12/21/17 118/64    Weight from Last 3 Encounters:   04/23/18 57.6 kg (127 lb)   12/27/17 57.8 kg (127 lb 6.4 oz)   12/21/17 64.4 kg (142 lb)              Today, you had the following     No orders found for display       Primary Care Provider Office Phone # Fax #    Brock Browne [phone] [address] [facility] [address]        Equal Access to Services     [facility] : Hadii rajendra Garcia, washerry christensen, qamehul kaalmada abdifatah, sterling delacruz. So [facility] [phone].    ATENCIÓN: Si habla español, tiene a ospina disposición servicios gratuitos de asistencia lingüística. Llame al [phone].    We comply with applicable federal civil rights laws and [place] laws. We do not discriminate on the basis of race, color, national origin, age, disability, sex, sexual orientation, or gender identity.            Thank you!     Thank you for choosing [facility] AND \[facility]\""  for your care. Our goal is always to provide you with excellent care. Hearing back from our patients is one way we can continue to improve our services. Please take a few minutes to complete the written survey that you may receive in the mail after your visit with us. Thank you!             Your Updated Medication List - Protect others around you: Learn how to safely use, store and throw away your medicines at www.disposemymeds.org.          This list is accurate as of 7/31/18 11:59 PM.  Always use your most recent med list.                   Brand Name Dispense Instructions for use Diagnosis    acetaminophen 500 MG tablet    TYLENOL     Take 500-1,000 mg by mouth every 6 hours as needed Max acetaminophen dose 4000mg in 24 hrs        atorvastatin 10 MG tablet    LIPITOR    90 tablet    Take 1  tablet (10 mg) by mouth At Bedtime    Hypercholesterolemia       lisinopril 5 MG tablet    PRINIVIL/ZESTRIL    90 tablet    TAKE 1 TABLET BY MOUTH EVERY DAY    Pulmonary hypertension       metoprolol tartrate 25 MG tablet    LOPRESSOR     Take 25 mg by mouth 2 times daily        XARELTO 20 MG Tabs tablet   Generic drug:  rivaroxaban ANTICOAGULANT      Take 20 mg by mouth daily (with dinner)

## 2018-08-07 DIAGNOSIS — I50.30 DIASTOLIC HEART FAILURE, UNSPECIFIED HEART FAILURE CHRONICITY: Primary | ICD-10-CM

## 2018-08-07 NOTE — LETTER
August 9, 2018      Jewels Ferrer  [address]  [facility] [address]        A refill of Furosemide (Lasix) was received. Additional refills will require an office visit with GRANT Roberts CNP. Please call [phone] to schedule an appointment.         Sincerely,        Refill Nurse

## 2018-08-09 RX ORDER — FUROSEMIDE 20 MG
TABLET ORAL
Qty: 90 TABLET | Refills: 0 | Status: SHIPPED | OUTPATIENT
Start: 2018-08-09 | End: 2018-01-01

## 2018-08-09 NOTE — TELEPHONE ENCOUNTER
Refill request for: Furosemide 20 mg tablets   From: [facility]  Rx written date: 07/27/2017  LOV: 08/29/2017  Next appt: none noted  Protocol: Diuretics protocol failed d/t no normal creatinine or BP under 140/90 in last 12 months.     Review of creatinine dating back to 2014 shows chronically low levels. BP on 12/21/2017 noted as 118/64. Per last office visit with GRANT Roberts CNP on 08/29/2017, pt is to f/u in 1 year. Will give ewa refill and send reminder letter. Prescription approved per [facility] Refill Protocol.  Debra Peters RN on 8/9/2018 at 4:33 PM

## 2018-08-13 NOTE — PROGRESS NOTES
PEDRO MADDOXAN    62 Y old Female, : 1955    Account Number: [id number]    [address], [place], [place]    Home: [phone]     Guarantor: PEDRO MADDOX Insurance: [facility] Payer ID:    PCP: Brock Browne MD    Appointment Facility: [facility]      2018 Clifford Stephens MD       Current Medications Reason for Appointment     1. HEARING EVALUATION     2. Hearing loss     History of Present Illness     HPI:   The patient is a 62-year-old female who is encouraged by her family to come in for evaluation of her ears and hearing. She has a strong family history of hearing loss in her mother and sister. She has had no otologic surgery. She has had no head trauma. She has had no history consistent with ototoxicity. She denies chronic recurring ear infections. She has had minimal noise exposure.     Examination     General Examination:  External auditory canals and TMs are clear bilaterally   Remainder of the head neck exam is unremarkable   Audiogram reveals a moderate sensorineural hearing loss with intact discrimination scores bilaterally.       Assessments     1. Sensorineural hearing loss (SNHL) of both ears - H90.3 (Primary)     Treatment     1. Others   Notes: I would advise a hearing aid trial. There is no medical contraindication hearing aid use. She will return to her local hearing aid dealer to proceed with a fitting.  Procedures  [ ].                Follow Up     prn         None          Past Medical History     Headaches.       HTN.       Surgical History Electronically signed by CLIFFORD STEPHENS MD on 2018 at 02:57 PM CDT    Gall bladder      Knee      Appendix      Social History Sign off status: Completed    Tobacco Use:   Smoking   History: never smoker  Second Hand Smoking Exposure   : Yes     Allergies     SULFA ALLERGY     Review of Systems     [facility]  [address] [place] [address]  Tel:  [phone]  Fax:       [ ].           Patient: PEDRO MADDOX : 1955 Progress Note: Clifford Stephens MD 2018        Note generated by [employer] EMR/PM Software ([email or web address])

## 2018-09-08 DIAGNOSIS — I10 HYPERTENSION, BENIGN: Primary | ICD-10-CM

## 2018-09-11 RX ORDER — METOPROLOL TARTRATE 25 MG/1
TABLET, FILM COATED ORAL
Qty: 180 TABLET | Refills: 3 | Status: SHIPPED | OUTPATIENT
Start: 2018-09-11 | End: 2018-01-01

## 2018-09-11 NOTE — TELEPHONE ENCOUNTER
Routing refill request to provider for review/approval because:  Fabby given x1 and patient did not follow up, please advise  Medication is reported/historical    LOV: 9/1/17  Called and informed patient and patient transferred to scheduling.    Will route to Brock Browne for review and consideration of refills.    Zara Greene RN on 9/11/2018 at 3:57 PM

## 2018-09-17 PROBLEM — K80.20 CHOLELITHIASES: Status: RESOLVED | Noted: 2017-12-08 | Resolved: 2018-01-01

## 2018-09-17 PROBLEM — K80.20 CALCULUS OF GALLBLADDER WITHOUT CHOLECYSTITIS WITHOUT OBSTRUCTION: Status: RESOLVED | Noted: 2017-09-01 | Resolved: 2018-01-01

## 2018-09-17 PROBLEM — K80.12 CHOLELITHIASIS WITH ACUTE ON CHRONIC CHOLECYSTITIS: Status: RESOLVED | Noted: 2017-12-07 | Resolved: 2018-01-01

## 2018-09-17 NOTE — PROGRESS NOTES
SUBJECTIVE:   Jewels Ferrer is a 63 year old female who presents to clinic today for the following health issues: Physical abdominal pain    HPI Comments:  Patient arrives here for physical and abdominal pain.  Patient reports she has been having abdominal pain for the last 2 months.  She has been under a lot of stress with her son dying.  Her mother recently dying.  She states that whenever she eats about an hour or 2 after she eats she gets epigastric discomfort.  She does not have a gallbladder.  She is also requesting a refill of all her medications.  No changes in bowel or bladder habits.  She denies any nausea vomiting.  She has not taken any medications.  For her stomach.    Physical         Patient Active Problem List    Diagnosis Date Noted     Hypercholesterolemia 01/30/2018     Priority: Medium     S/P laparoscopic cholecystectomy 01/02/2018     Priority: Medium     Acute respiratory failure with hypoxia and hypercarbia (H) 12/11/2017     Priority: Medium     RBBB (right bundle branch block) 12/08/2017     Priority: Medium     Diastolic heart failure (H) 07/27/2017     Priority: Medium     Overview:   normal LVEF s/p TTE 8/21/2017; Dr Palacios, [facility] last seen 8/25/2017       Paroxysmal atrial fibrillation (H) 07/27/2017     Priority: Medium     Overview:   chronic xarelto       Non-rheumatic mitral regurgitation 06/22/2017     Priority: Medium     Overview:   mild to moderate s/p TTE 8/21/2017       Severe obstructive sleep apnea 01/01/2015     Priority: Medium     Overview:   doesn't use CPAP       S/P arthroscopic knee surgery 09/08/2014     Priority: Medium     Left knee DJD 07/31/2014     Priority: Medium     Esophageal reflux 06/06/2012     Priority: Medium     Hypertension, benign 09/07/2011     Priority: Medium     Past Medical History:   Diagnosis Date     Acquired absence of other specified parts of digestive tract     1/2/2018     Calculus of gallbladder without cholecystitis without  obstruction     2017,US done in [employer]     Chest pain     8/3/2004,normal sestamibi cardiac scan     Diastolic congestive heart failure (H)     07/27/2017,normal LVEF s/p TTE 8/21/2017; Dr Palacios, [facility] last seen 8/25/2017     Essential (primary) hypertension     9/7/2011     Headache     6/6/2012     Nonrheumatic mitral valve insufficiency     06/22/2017,mild to moderate s/p TTE 8/21/2017     Obstructive sleep apnea     2015,doesn't use CPAP     Other internal derangements of unspecified knee     8/22/2014     Other specified postprocedural states     9/8/2014     Other tear of medial meniscus, current injury, unspecified knee, initial encounter     8/8/2014     Paroxysmal atrial fibrillation (H)     07/27/2017,chronic xarelto     Urinary tract infection     No Comments Provided      Past Surgical History:   Procedure Laterality Date     APPENDECTOMY OPEN      2/1995     ARTHROSCOPY KNEE      8/23/2007, left knee by Dr. Garcia     COLONOSCOPY      4/2006,Normal screening. follow-up recommended in 10 years     DILATION AND CURETTAGE      for endometritis     OTHER SURGICAL HISTORY      295050,OTHER,surgery     OTHER SURGICAL HISTORY      9/8/2014,150480,OTHER,Left     Social History     Social History Narrative    .  3 boys.  paper route     Current Outpatient Prescriptions   Medication Sig Dispense Refill     acetaminophen (TYLENOL) 500 MG tablet Take 500-1,000 mg by mouth every 6 hours as needed Max acetaminophen dose 4000mg in 24 hrs       atorvastatin (LIPITOR) 10 MG tablet Take 1 tablet (10 mg) by mouth At Bedtime 90 tablet 3     furosemide (LASIX) 20 MG tablet Take 1 tablet (20 mg) by mouth every morning 90 tablet 3     lisinopril (PRINIVIL/ZESTRIL) 5 MG tablet Take 1 tablet (5 mg) by mouth daily 90 tablet 3     metoprolol tartrate (LOPRESSOR) 25 MG tablet Take 1 tablet (25 mg) by mouth 2 times daily 180 tablet 3     omeprazole (PRILOSEC) 40 MG capsule Take 1 capsule (40 mg) by mouth daily  Take 30-60 minutes before a meal. 30 capsule 1     rivaroxaban ANTICOAGULANT (XARELTO) 20 MG TABS tablet Take 1 tablet (20 mg) by mouth daily (with dinner) 90 tablet 3     [DISCONTINUED] atorvastatin (LIPITOR) 10 MG tablet Take 1 tablet (10 mg) by mouth At Bedtime 90 tablet 1     [DISCONTINUED] furosemide (LASIX) 20 MG tablet TAKE 1 TABLET BY MOUTH EVERY MORNING 90 tablet 0     [DISCONTINUED] lisinopril (PRINIVIL/ZESTRIL) 5 MG tablet TAKE 1 TABLET BY MOUTH EVERY DAY 90 tablet 3     [DISCONTINUED] metoprolol tartrate (LOPRESSOR) 25 MG tablet TAKE 1 TABLET BY MOUTH TWICE DAILY 180 tablet 3     [DISCONTINUED] rivaroxaban ANTICOAGULANT (XARELTO) 20 MG TABS tablet Take 20 mg by mouth daily (with dinner)       Allergies   Allergen Reactions     Meclofenamate Unknown     Naproxen Unknown and Nausea and Vomiting     Sulfa Drugs Rash     vomiting       Review of Systems   Constitutional: Negative for chills and fever.   Eyes: Negative.    Respiratory: Negative.    Cardiovascular: Negative for chest pain.   Gastrointestinal: Positive for abdominal pain. Negative for abdominal distention, anal bleeding, constipation, diarrhea, heartburn, hematochezia, nausea and vomiting.   Genitourinary: Negative.    Neurological: Negative for dizziness.   Psychiatric/Behavioral: Negative.         OBJECTIVE:     /60 (BP Location: Left arm, Patient Position: Sitting)  Pulse 60  Wt 132 lb 12.8 oz (60.2 kg)  BMI 23.52 kg/m2  Body mass index is 23.52 kg/(m^2).  Physical Exam   Constitutional: She appears well-developed and well-nourished.   HENT:   Right Ear: External ear normal.   Eyes: Pupils are equal, round, and reactive to light.   Cardiovascular: Normal rate, regular rhythm and normal heart sounds.    No murmur heard.  Pulmonary/Chest: Effort normal and breath sounds normal.   Abdominal: Soft. There is tenderness.   Tenderness in the epigastric area   Neurological: She is alert.   Skin: Skin is warm.   Psychiatric: She has a normal  mood and affect.       Diagnostic Test Results:  Results for orders placed or performed in visit on 09/17/18 (from the past 24 hour(s))   CBC W PLT No Diff   Result Value Ref Range    WBC 7.3 4.0 - 11.0 10e9/L    RBC Count 4.94 3.8 - 5.2 10e12/L    Hemoglobin 13.7 11.7 - 15.7 g/dL    Hematocrit 42.1 35.0 - 47.0 %    MCV 85 78 - 100 fl    MCH 27.7 26.5 - 33.0 pg    MCHC 32.5 31.5 - 36.5 g/dL    RDW 13.5 10.0 - 15.0 %    Platelet Count 236 150 - 450 10e9/L   Comprehensive Metabolic Panel   Result Value Ref Range    Sodium 142 134 - 144 mmol/L    Potassium 4.3 3.5 - 5.1 mmol/L    Chloride 106 98 - 107 mmol/L    Carbon Dioxide 31 21 - 31 mmol/L    Anion Gap 5 3 - 14 mmol/L    Glucose 136 (H) 70 - 105 mg/dL    Urea Nitrogen 16 7 - 25 mg/dL    Creatinine 0.46 (L) 0.60 - 1.20 mg/dL    GFR Estimate >90 >60 mL/min/1.7m2    GFR Estimate If Black >90 >60 mL/min/1.7m2    Calcium 10.0 8.6 - 10.3 mg/dL    Bilirubin Total 0.6 0.3 - 1.0 mg/dL    Albumin 3.9 3.5 - 5.7 g/dL    Protein Total 6.6 6.4 - 8.9 g/dL    Alkaline Phosphatase 83 34 - 104 U/L    ALT 36 7 - 52 U/L    AST 34 13 - 39 U/L   Lipase   Result Value Ref Range    Lipase 14 11 - 82 U/L       ASSESSMENT/PLAN:         1. Hypercholesterolemia    - atorvastatin (LIPITOR) 10 MG tablet; Take 1 tablet (10 mg) by mouth At Bedtime  Dispense: 90 tablet; Refill: 3    2. Diastolic heart failure, unspecified heart failure chronicity (H)  Refill  - furosemide (LASIX) 20 MG tablet; Take 1 tablet (20 mg) by mouth every morning  Dispense: 90 tablet; Refill: 3    3. Pulmonary hypertension    - lisinopril (PRINIVIL/ZESTRIL) 5 MG tablet; Take 1 tablet (5 mg) by mouth daily  Dispense: 90 tablet; Refill: 3  - TDAP VACCINE (BOOSTRIX)    4. Hypertension, benign  Currently under good control  - metoprolol tartrate (LOPRESSOR) 25 MG tablet; Take 1 tablet (25 mg) by mouth 2 times daily  Dispense: 180 tablet; Refill: 3  - Comprehensive Metabolic Panel; Future  - Comprehensive Metabolic Panel    5.  Paroxysmal atrial fibrillation (H)  Continue  - rivaroxaban ANTICOAGULANT (XARELTO) 20 MG TABS tablet; Take 1 tablet (20 mg) by mouth daily (with dinner)  Dispense: 90 tablet; Refill: 3    6. Abdominal pain, epigastric  Likely gastritis.  Start Prilosec.  Follow-up in 2 weeks for recheck.  If pain persists consider endoscopy  - CBC W PLT No Diff; Future  - H Pylori antigen, Stool; Future  - Lipase; Future  - omeprazole (PRILOSEC) 40 MG capsule; Take 1 capsule (40 mg) by mouth daily Take 30-60 minutes before a meal.  Dispense: 30 capsule; Refill: 1  - CBC W PLT No Diff  - Lipase    7. Encounter for screening mammogram for breast cancer  Scheduled patient declines Pap smear stating she will make another appointment  - MA Screening Digital Bilateral; Future      Brock Browne MD  [facility] AND [facility]

## 2018-09-17 NOTE — NURSING NOTE
Patient here for physical with medication refills. Concerns with upset stomach for the past 3-4 months. Last eye 2016 and last dental years ago. Amira Munguia LPN .......................9/17/2018  1:14 PM

## 2018-09-17 NOTE — LETTER
September 19, 2018      Jewels Ferrer  [address]  [place] [facility] [address]        Dear ,    We are writing to inform you of your test results.    As you can see her blood sugar is elevated and could be a sign of diabetes.  I would recommend a follow-up visit here in the clinic fasting.  You can have supper the previous night before but nothing after that.  Will redraw your blood and if it continues to be elevated then you likely have diabetes.      Resulted Orders   CBC W PLT No Diff   Result Value Ref Range    WBC 7.3 4.0 - 11.0 10e9/L    RBC Count 4.94 3.8 - 5.2 10e12/L    Hemoglobin 13.7 11.7 - 15.7 g/dL    Hematocrit 42.1 35.0 - 47.0 %    MCV 85 78 - 100 fl    MCH 27.7 26.5 - 33.0 pg    MCHC 32.5 31.5 - 36.5 g/dL    RDW 13.5 10.0 - 15.0 %    Platelet Count 236 150 - 450 10e9/L   Comprehensive Metabolic Panel   Result Value Ref Range    Sodium 142 134 - 144 mmol/L    Potassium 4.3 3.5 - 5.1 mmol/L    Chloride 106 98 - 107 mmol/L    Carbon Dioxide 31 21 - 31 mmol/L    Anion Gap 5 3 - 14 mmol/L    Glucose 136 (H) 70 - 105 mg/dL    Urea Nitrogen 16 7 - 25 mg/dL    Creatinine 0.46 (L) 0.60 - 1.20 mg/dL    GFR Estimate >90 >60 mL/min/1.7m2    GFR Estimate If Black >90 >60 mL/min/1.7m2    Calcium 10.0 8.6 - 10.3 mg/dL    Bilirubin Total 0.6 0.3 - 1.0 mg/dL    Albumin 3.9 3.5 - 5.7 g/dL    Protein Total 6.6 6.4 - 8.9 g/dL    Alkaline Phosphatase 83 34 - 104 U/L    ALT 36 7 - 52 U/L    AST 34 13 - 39 U/L   Lipase   Result Value Ref Range    Lipase 14 11 - 82 U/L       If you have any questions or concerns, please call the clinic at the number listed above.       Sincerely,        Brock Browne MD

## 2018-09-17 NOTE — MR AVS SNAPSHOT
After Visit Summary   9/17/2018    Jewels Ferrer    MRN: [id number]           Patient Information     Date Of Birth          1955        Visit Information        Provider Department      9/17/2018 1:15 PM Brock Browne MD [facility]        Today's Diagnoses     Paroxysmal atrial fibrillation (H)    -  1    Hypercholesterolemia        Diastolic heart failure, unspecified heart failure chronicity (H)        Pulmonary hypertension        Hypertension, benign        Abdominal pain, epigastric        Encounter for screening mammogram for breast cancer           Follow-ups after your visit        Your next 10 appointments already scheduled     Oct 01, 2018  2:00 PM CDT   SHORT with Brock Browne MD   [facility] ([facility]    [address]  [place] [address] [phone]              Future tests that were ordered for you today     Open Future Orders        Priority Expected Expires Ordered    MA Screening Digital Bilateral Routine  9/17/2019 9/17/2018    H Pylori antigen, Stool Routine  9/17/2019 9/17/2018            Who to contact     If you have questions or need follow up information about today's clinic visit or your schedule please contact [facility] directly at [phone].  Normal or non-critical lab and imaging results will be communicated to you by MyChart, letter or phone within 4 business days after the clinic has received the results. If you do not hear from us within 7 days, please contact the clinic through Dittohart or phone. If you have a critical or abnormal lab result, we will notify you by phone as soon as possible.  Submit refill requests through Fun City or call your pharmacy and they will forward the refill request to us. Please allow 3 business days for your refill to be completed.          Additional Information About Your Visit        MyChart Information     App[employer] In[employer]  "lets you send messages to your doctor, view your test results, renew your prescriptions, schedule appointments and more. To sign up, go to [email or web address] . Click on \"Log in\" on the left side of the screen, which will take you to the Welcome page. Then click on \"Sign up Now\" on the right side of the page.     You will be asked to enter the access code listed below, as well as some personal information. Please follow the directions to create your username and password.     Your access code is: [email or web address]  Expires: 2018  9:25 AM     Your access code will  in 90 days. If you need help or a new code, please call your [place] clinic or [phone].        Care EveryWhere ID     This is your Care EveryWhere ID. This could be used by other organizations to access your [place] medical records  [id number]        Your Vitals Were     Pulse BMI (Body Mass Index)                60 23.52 kg/m2           Blood Pressure from Last 3 Encounters:   18 116/60   18 152/62   17 118/68    Weight from Last 3 Encounters:   18 60.2 kg (132 lb 12.8 oz)   18 57.6 kg (127 lb)   17 57.8 kg (127 lb 6.4 oz)              We Performed the Following     CBC W PLT No Diff     Comprehensive Metabolic Panel     Lipase     TDAP VACCINE (BOOSTRIX)          Today's Medication Changes          These changes are accurate as of 18  1:54 PM.  If you have any questions, ask your nurse or doctor.               Start taking these medicines.        Dose/Directions    omeprazole 40 MG capsule   Commonly known as:  priLOSEC   Used for:  Abdominal pain, epigastric   Started by:  Brock Browne MD        Dose:  40 mg   Take 1 capsule (40 mg) by mouth daily Take 30-60 minutes before a meal.   Quantity:  30 capsule   Refills:  1         These medicines have changed or have updated prescriptions.        Dose/Directions    furosemide 20 MG tablet   Commonly known as:  LASIX   This may have changed:  " See the new instructions.   Used for:  Diastolic heart failure, unspecified heart failure chronicity (H)   Changed by:  Brock Browne MD        Dose:  20 mg   Take 1 tablet (20 mg) by mouth every morning   Quantity:  90 tablet   Refills:  3       lisinopril 5 MG tablet   Commonly known as:  PRINIVIL/ZESTRIL   This may have changed:  See the new instructions.   Used for:  Pulmonary hypertension   Changed by:  Brock Browne MD        Dose:  5 mg   Take 1 tablet (5 mg) by mouth daily   Quantity:  90 tablet   Refills:  3       metoprolol tartrate 25 MG tablet   Commonly known as:  LOPRESSOR   This may have changed:  See the new instructions.   Used for:  Hypertension, benign   Changed by:  Brock Browne MD        Dose:  25 mg   Take 1 tablet (25 mg) by mouth 2 times daily   Quantity:  180 tablet   Refills:  3            Where to get your medicines      These medications were sent to [id number] - [place], [place] - [address]  [address], [facility] [address]     Phone:  [phone]     atorvastatin 10 MG tablet    furosemide 20 MG tablet    lisinopril 5 MG tablet    metoprolol tartrate 25 MG tablet    omeprazole 40 MG capsule    rivaroxaban ANTICOAGULANT 20 MG Tabs tablet                Primary Care Provider Office Phone # Fax #    Brock Browne -[phone]       [address] [facility] [address]        Equal Access to Services     [facility] AH: Hadii aad ku hadasho Soomaali, waaxda luqadaha, qaybta kaalmada adeegyada, [place] . So [facility] [phone].    ATENCIÓN: Si habla español, tiene a ospina disposición servicios gratuitos de asistencia lingüística. Llame al [phone].    We comply with applicable federal civil rights laws and [place] laws. We do not discriminate on the basis of race, color, national origin, age, disability, sex, sexual orientation, or gender identity.            Thank  you!     Thank you for choosing [facility] AND \[facility]\""  for your care. Our goal is always to provide you with excellent care. Hearing back from our patients is one way we can continue to improve our services. Please take a few minutes to complete the written survey that you may receive in the mail after your visit with us. Thank you!             Your Updated Medication List - Protect others around you: Learn how to safely use, store and throw away your medicines at www.disposemymeds.org.          This list is accurate as of 9/17/18  1:54 PM.  Always use your most recent med list.                   Brand Name Dispense Instructions for use Diagnosis    acetaminophen 500 MG tablet    TYLENOL     Take 500-1,000 mg by mouth every 6 hours as needed Max acetaminophen dose 4000mg in 24 hrs        atorvastatin 10 MG tablet    LIPITOR    90 tablet    Take 1 tablet (10 mg) by mouth At Bedtime    Hypercholesterolemia       furosemide 20 MG tablet    LASIX    90 tablet    Take 1 tablet (20 mg) by mouth every morning    Diastolic heart failure, unspecified heart failure chronicity (H)       lisinopril 5 MG tablet    PRINIVIL/ZESTRIL    90 tablet    Take 1 tablet (5 mg) by mouth daily    Pulmonary hypertension       metoprolol tartrate 25 MG tablet    LOPRESSOR    180 tablet    Take 1 tablet (25 mg) by mouth 2 times daily    Hypertension, benign       omeprazole 40 MG capsule    priLOSEC    30 capsule    Take 1 capsule (40 mg) by mouth daily Take 30-60 minutes before a meal.    Abdominal pain, epigastric       rivaroxaban ANTICOAGULANT 20 MG Tabs tablet    XARELTO    90 tablet    Take 1 tablet (20 mg) by mouth daily (with dinner)    Paroxysmal atrial fibrillation (H)

## 2018-10-12 PROBLEM — K29.30 SUPERFICIAL GASTRITIS WITHOUT HEMORRHAGE: Status: ACTIVE | Noted: 2018-01-01

## 2018-10-12 PROBLEM — R73.9 BLOOD GLUCOSE ELEVATED: Status: ACTIVE | Noted: 2018-01-01

## 2018-10-12 NOTE — MR AVS SNAPSHOT
"              After Visit Summary   10/12/2018    Jewels Ferrer    MRN: [id number]           Patient Information     Date Of Birth          1955        Visit Information        Provider Department      10/12/2018 1:00 PM Brock Browne MD [facility]        Today's Diagnoses     Need for immunization against influenza    -  1    Chronic superficial gastritis without bleeding        Abdominal pain, epigastric        Blood glucose elevated           Follow-ups after your visit        Who to contact     If you have questions or need follow up information about today's clinic visit or your schedule please contact [facility] directly at [phone].  Normal or non-critical lab and imaging results will be communicated to you by Fandiumhart, letter or phone within 4 business days after the clinic has received the results. If you do not hear from us within 7 days, please contact the clinic through Fandiumhart or phone. If you have a critical or abnormal lab result, we will notify you by phone as soon as possible.  Submit refill requests through ActionIQ or call your pharmacy and they will forward the refill request to us. Please allow 3 business days for your refill to be completed.          Additional Information About Your Visit        MyChart Information     ActionIQ lets you send messages to your doctor, view your test results, renew your prescriptions, schedule appointments and more. To sign up, go to [email or web address] . Click on \"Log in\" on the left side of the screen, which will take you to the Welcome page. Then click on \"Sign up Now\" on the right side of the page.     You will be asked to enter the access code listed below, as well as some personal information. Please follow the directions to create your username and password.     Your access code is: [email or web address]  Expires: 2018  9:25 AM     Your access code will  in 90 days. If you need help or a new " code, please call your [place] clinic or [phone].        Care EveryWhere ID     This is your Care EveryWhere ID. This could be used by other organizations to access your [place] medical records  [id number]        Your Vitals Were     Pulse BMI (Body Mass Index)                60 24.2 kg/m2           Blood Pressure from Last 3 Encounters:   10/12/18 110/60   09/17/18 116/60   04/23/18 152/62    Weight from Last 3 Encounters:   10/12/18 62 kg (136 lb 9.6 oz)   09/17/18 60.2 kg (132 lb 12.8 oz)   04/23/18 57.6 kg (127 lb)              We Performed the Following     GH IMM-  HC FLU VAC PRESRV FREE QUAD SPLIT VIR 3+YRS IM     Glucose     Hemoglobin A1c          Where to get your medicines      These medications were sent to [id number] - [place], [place] - [address]  [address], [facility] [address]     Phone:  [phone]     omeprazole 40 MG capsule          Primary Care Provider Office Phone # Fax #    Brock Browne [phone] [address]  [facility] [address]        Equal Access to Services     [facility] AH: Hadii aad ku hadasho Soomaali, waaxda luqadaha, qaybta kaalmada adeegyada, [place] . So [facility] [phone].    ATENCIÓN: Si habla español, tiene a ospina disposición servicios gratuitos de asistencia lingüística. Llame al [phone].    We comply with applicable federal civil rights laws and [place] laws. We do not discriminate on the basis of race, color, national origin, age, disability, sex, sexual orientation, or gender identity.            Thank you!     Thank you for choosing [facility] AND [facility]  for your care. Our goal is always to provide you with excellent care. Hearing back from our patients is one way we can continue to improve our services. Please take a few minutes to complete the written survey that you may receive in the mail after your visit with us.  Thank you!             Your Updated Medication List - Protect others around you: Learn how to safely use, store and throw away your medicines at www.disposemymeds.org.          This list is accurate as of 10/12/18  1:25 PM.  Always use your most recent med list.                   Brand Name Dispense Instructions for use Diagnosis    acetaminophen 500 MG tablet    TYLENOL     Take 500-1,000 mg by mouth every 6 hours as needed Max acetaminophen dose 4000mg in 24 hrs        atorvastatin 10 MG tablet    LIPITOR    90 tablet    Take 1 tablet (10 mg) by mouth At Bedtime    Hypercholesterolemia       furosemide 20 MG tablet    LASIX    90 tablet    Take 1 tablet (20 mg) by mouth every morning    Diastolic heart failure, unspecified heart failure chronicity       lisinopril 5 MG tablet    PRINIVIL/ZESTRIL    90 tablet    Take 1 tablet (5 mg) by mouth daily    Pulmonary hypertension (H)       metoprolol tartrate 25 MG tablet    LOPRESSOR    180 tablet    Take 1 tablet (25 mg) by mouth 2 times daily    Hypertension, benign       omeprazole 40 MG capsule    priLOSEC    90 capsule    Take 1 capsule (40 mg) by mouth daily Take 30-60 minutes before a meal.    Abdominal pain, epigastric       rivaroxaban ANTICOAGULANT 20 MG Tabs tablet    XARELTO    90 tablet    Take 1 tablet (20 mg) by mouth daily (with dinner)    Paroxysmal atrial fibrillation (H)

## 2018-10-12 NOTE — LETTER
October 12, 2018      Jewels Ferrer  [address]  [place] [facility] [address]        Dear ,    We are writing to inform you of your test results.    Your test results fall within the expected range(s) or remain unchanged from previous results.  Please continue with current treatment plan.    Resulted Orders   Glucose   Result Value Ref Range    Glucose 119 (H) 70 - 105 mg/dL   Hemoglobin A1c   Result Value Ref Range    Hemoglobin A1C 5.4 4.0 - 6.0 %       If you have any questions or concerns, please call the clinic at the number listed above.       Sincerely,        Brock Browne MD

## 2018-10-12 NOTE — NURSING NOTE
Patient here for abdominal issue follow up. The pain is gone when she takes her omeprazole daily. Amira Munguia LPN .......................10/12/2018  12:53 PM

## 2018-10-15 NOTE — PROGRESS NOTES
SUBJECTIVE:   Jewels Ferrer is a 63 year old female who presents to clinic today for the following health issues: Follow-up abdominal pain go over labs flu vaccine    HPI Comments: Patient arrives here for follow-up abdominal pain and go over labs.  She did have an elevated blood sugar but this was not done fasting.  She also is requesting a flu vaccine.  The abdominal pain has resolved.  She is feeling much better.  Has no specific concerns.        Patient Active Problem List    Diagnosis Date Noted     Superficial gastritis without hemorrhage 10/12/2018     Priority: Medium     Blood glucose elevated 10/12/2018     Priority: Medium     Hypercholesterolemia 01/30/2018     Priority: Medium     S/P laparoscopic cholecystectomy 01/02/2018     Priority: Medium     Acute respiratory failure with hypoxia and hypercarbia (H) 12/11/2017     Priority: Medium     RBBB (right bundle branch block) 12/08/2017     Priority: Medium     Diastolic heart failure (H) 07/27/2017     Priority: Medium     Overview:   normal LVEF s/p TTE 8/21/2017; Dr Palacios, [facility] last seen 8/25/2017       Paroxysmal atrial fibrillation (H) 07/27/2017     Priority: Medium     Overview:   chronic xarelto       Non-rheumatic mitral regurgitation 06/22/2017     Priority: Medium     Overview:   mild to moderate s/p TTE 8/21/2017       Severe obstructive sleep apnea 01/01/2015     Priority: Medium     Overview:   doesn't use CPAP       S/P arthroscopic knee surgery 09/08/2014     Priority: Medium     Left knee DJD 07/31/2014     Priority: Medium     Esophageal reflux 06/06/2012     Priority: Medium     Hypertension, benign 09/07/2011     Priority: Medium     Past Medical History:   Diagnosis Date     Acquired absence of other specified parts of digestive tract     1/2/2018     Calculus of gallbladder without cholecystitis without obstruction     2017,US done in [place]     Chest pain     8/3/2004,normal sestamibi cardiac scan     Diastolic  congestive heart failure (H)     07/27/2017,normal LVEF s/p TTE 8/21/2017; Dr Palacios, [facility] last seen 8/25/2017     Essential (primary) hypertension     9/7/2011     Headache     6/6/2012     Nonrheumatic mitral valve insufficiency     06/22/2017,mild to moderate s/p TTE 8/21/2017     Obstructive sleep apnea     2015,doesn't use CPAP     Other internal derangements of unspecified knee     8/22/2014     Other specified postprocedural states     9/8/2014     Other tear of medial meniscus, current injury, unspecified knee, initial encounter     8/8/2014     Paroxysmal atrial fibrillation (H)     07/27/2017,chronic xarelto     Urinary tract infection     No Comments Provided      Past Surgical History:   Procedure Laterality Date     APPENDECTOMY OPEN      2/1995     ARTHROSCOPY KNEE      8/23/2007, left knee by Dr. Garcia     COLONOSCOPY      4/2006,Normal screening. follow-up recommended in 10 years     DILATION AND CURETTAGE      for endometritis     OTHER SURGICAL HISTORY      732769,OTHER,surgery     OTHER SURGICAL HISTORY      9/8/2014,804260,OTHER,Left     Allergies   Allergen Reactions     Meclofenamate Unknown     Naproxen Unknown and Nausea and Vomiting     Sulfa Drugs Rash     vomiting       Review of Systems     OBJECTIVE:     /60 (BP Location: Left arm, Patient Position: Sitting)  Pulse 60  Wt 136 lb 9.6 oz (62 kg)  BMI 24.2 kg/m2  Body mass index is 24.2 kg/(m^2).  Physical Exam   Constitutional: She appears well-developed and well-nourished.   Pulmonary/Chest: Effort normal.       Diagnostic Test Results:  Results for orders placed or performed in visit on 10/12/18   Glucose   Result Value Ref Range    Glucose 119 (H) 70 - 105 mg/dL   Hemoglobin A1c   Result Value Ref Range    Hemoglobin A1C 5.4 4.0 - 6.0 %       ASSESSMENT/PLAN:         1. Chronic superficial gastritis without bleeding  Resolved    2. Abdominal pain, epigastric  Continue Prilosec  - omeprazole (PRILOSEC) 40 MG capsule;  Take 1 capsule (40 mg) by mouth daily Take 30-60 minutes before a meal.  Dispense: 90 capsule; Refill: 2    3. Blood glucose elevated  Labs negative for diabetes  - Glucose; Future  - Hemoglobin A1c; Future  - Glucose  - Hemoglobin A1c    4. Need for immunization against influenza  Update  -  IMM-  HC FLU VAC PRESRV FREE QUAD SPLIT VIR 3+YRS IM      Brock Browne MD  [facility] AND [facility]

## 2019-01-01 ENCOUNTER — OFFICE VISIT (OUTPATIENT)
Dept: FAMILY MEDICINE | Facility: OTHER | Age: 64
End: 2019-01-01
Attending: FAMILY MEDICINE
Payer: COMMERCIAL

## 2019-01-01 ENCOUNTER — TRANSFERRED RECORDS (OUTPATIENT)
Dept: HEALTH INFORMATION MANAGEMENT | Facility: OTHER | Age: 64
End: 2019-01-01

## 2019-01-01 ENCOUNTER — HOSPITAL ENCOUNTER (OUTPATIENT)
Dept: GENERAL RADIOLOGY | Facility: OTHER | Age: 64
Discharge: HOME | End: 2019-08-07
Attending: FAMILY MEDICINE | Admitting: FAMILY MEDICINE
Payer: COMMERCIAL

## 2019-01-01 ENCOUNTER — ANESTHESIA EVENT (OUTPATIENT)
Dept: EMERGENCY MEDICINE | Facility: OTHER | Age: 64
End: 2019-01-01
Payer: COMMERCIAL

## 2019-01-01 ENCOUNTER — HOSPITAL ENCOUNTER (EMERGENCY)
Facility: OTHER | Age: 64
Discharge: ANOTHER HOSPITAL | End: 2019-08-10
Attending: FAMILY MEDICINE | Admitting: FAMILY MEDICINE
Payer: COMMERCIAL

## 2019-01-01 ENCOUNTER — ANESTHESIA (OUTPATIENT)
Dept: EMERGENCY MEDICINE | Facility: OTHER | Age: 64
End: 2019-01-01
Payer: COMMERCIAL

## 2019-01-01 ENCOUNTER — TELEPHONE (OUTPATIENT)
Dept: FAMILY MEDICINE | Facility: OTHER | Age: 64
End: 2019-01-01

## 2019-01-01 VITALS
HEIGHT: 63 IN | TEMPERATURE: 97.2 F | HEART RATE: 133 BPM | BODY MASS INDEX: 22.15 KG/M2 | DIASTOLIC BLOOD PRESSURE: 93 MMHG | OXYGEN SATURATION: 100 % | SYSTOLIC BLOOD PRESSURE: 113 MMHG | WEIGHT: 125 LBS | RESPIRATION RATE: 111 BRPM

## 2019-01-01 VITALS
TEMPERATURE: 96.1 F | SYSTOLIC BLOOD PRESSURE: 108 MMHG | WEIGHT: 136 LBS | HEART RATE: 80 BPM | RESPIRATION RATE: 20 BRPM | DIASTOLIC BLOOD PRESSURE: 62 MMHG | HEIGHT: 63 IN | BODY MASS INDEX: 24.1 KG/M2

## 2019-01-01 VITALS
TEMPERATURE: 97.3 F | DIASTOLIC BLOOD PRESSURE: 56 MMHG | HEART RATE: 88 BPM | SYSTOLIC BLOOD PRESSURE: 92 MMHG | WEIGHT: 123.8 LBS | BODY MASS INDEX: 21.93 KG/M2 | RESPIRATION RATE: 24 BRPM | HEIGHT: 63 IN

## 2019-01-01 DIAGNOSIS — I50.30 DIASTOLIC HEART FAILURE, UNSPECIFIED HF CHRONICITY (H): Primary | ICD-10-CM

## 2019-01-01 DIAGNOSIS — R29.898 WEAKNESS OF BOTH LOWER EXTREMITIES: ICD-10-CM

## 2019-01-01 DIAGNOSIS — I27.20 PULMONARY HYPERTENSION (H): ICD-10-CM

## 2019-01-01 DIAGNOSIS — S93.402A SPRAIN OF LEFT ANKLE, UNSPECIFIED LIGAMENT, INITIAL ENCOUNTER: ICD-10-CM

## 2019-01-01 DIAGNOSIS — I48.91 ATRIAL FIBRILLATION WITH RAPID VENTRICULAR RESPONSE (H): ICD-10-CM

## 2019-01-01 DIAGNOSIS — R29.898 WEAKNESS OF BOTH LOWER EXTREMITIES: Primary | ICD-10-CM

## 2019-01-01 DIAGNOSIS — I46.9 CARDIOPULMONARY ARREST WITH SUCCESSFUL RESUSCITATION (H): ICD-10-CM

## 2019-01-01 DIAGNOSIS — R79.89 DECREASED THYROID STIMULATING HORMONE (TSH) LEVEL: ICD-10-CM

## 2019-01-01 LAB
ALBUMIN SERPL-MCNC: 3.3 G/DL (ref 3.5–5.7)
ALBUMIN SERPL-MCNC: 3.8 G/DL (ref 3.5–5.7)
ALP SERPL-CCNC: 84 U/L (ref 34–104)
ALP SERPL-CCNC: 89 U/L (ref 34–104)
ALT SERPL W P-5'-P-CCNC: 22 U/L (ref 7–52)
ALT SERPL W P-5'-P-CCNC: 40 U/L (ref 7–52)
ANION GAP SERPL CALCULATED.3IONS-SCNC: 16 MMOL/L (ref 3–14)
ANION GAP SERPL CALCULATED.3IONS-SCNC: 8 MMOL/L (ref 3–14)
APTT PPP: 32 SEC (ref 22–37)
AST SERPL W P-5'-P-CCNC: 27 U/L (ref 13–39)
AST SERPL W P-5'-P-CCNC: 80 U/L (ref 13–39)
BASOPHILS # BLD AUTO: 0 10E9/L (ref 0–0.2)
BASOPHILS NFR BLD AUTO: 0.3 %
BILIRUB SERPL-MCNC: 0.8 MG/DL (ref 0.3–1)
BILIRUB SERPL-MCNC: 0.8 MG/DL (ref 0.3–1)
BUN SERPL-MCNC: 15 MG/DL (ref 7–25)
BUN SERPL-MCNC: 19 MG/DL (ref 7–25)
CALCIUM SERPL-MCNC: 10.4 MG/DL (ref 8.6–10.3)
CALCIUM SERPL-MCNC: 9.6 MG/DL (ref 8.6–10.3)
CHLORIDE SERPL-SCNC: 102 MMOL/L (ref 98–107)
CHLORIDE SERPL-SCNC: 107 MMOL/L (ref 98–107)
CO2 SERPL-SCNC: 22 MMOL/L (ref 21–31)
CO2 SERPL-SCNC: 32 MMOL/L (ref 21–31)
CREAT SERPL-MCNC: 0.36 MG/DL (ref 0.6–1.2)
CREAT SERPL-MCNC: 0.51 MG/DL (ref 0.6–1.2)
DIFFERENTIAL METHOD BLD: ABNORMAL
EOSINOPHIL # BLD AUTO: 0 10E9/L (ref 0–0.7)
EOSINOPHIL NFR BLD AUTO: 0.3 %
ERYTHROCYTE [DISTWIDTH] IN BLOOD BY AUTOMATED COUNT: 15.8 % (ref 10–15)
GFR SERPL CREATININE-BSD FRML MDRD: >90 ML/MIN/{1.73_M2}
GFR SERPL CREATININE-BSD FRML MDRD: >90 ML/MIN/{1.73_M2}
GLUCOSE SERPL-MCNC: 122 MG/DL (ref 70–105)
GLUCOSE SERPL-MCNC: 134 MG/DL (ref 70–105)
HCT VFR BLD AUTO: 47.7 % (ref 35–47)
HGB BLD-MCNC: 14.8 G/DL (ref 11.7–15.7)
IMM GRANULOCYTES # BLD: 0.2 10E9/L (ref 0–0.4)
IMM GRANULOCYTES NFR BLD: 2.1 %
INR PPP: 1.14 (ref 0–1.3)
LYMPHOCYTES # BLD AUTO: 3.5 10E9/L (ref 0.8–5.3)
LYMPHOCYTES NFR BLD AUTO: 29.6 %
MAGNESIUM SERPL-MCNC: 2.3 MG/DL (ref 1.9–2.7)
MCH RBC QN AUTO: 27 PG (ref 26.5–33)
MCHC RBC AUTO-ENTMCNC: 31 G/DL (ref 31.5–36.5)
MCV RBC AUTO: 87 FL (ref 78–100)
MONOCYTES # BLD AUTO: 1.3 10E9/L (ref 0–1.3)
MONOCYTES NFR BLD AUTO: 10.7 %
NEUTROPHILS # BLD AUTO: 6.7 10E9/L (ref 1.6–8.3)
NEUTROPHILS NFR BLD AUTO: 57 %
NT-PROBNP SERPL-MCNC: 379 PG/ML (ref 0–100)
PLATELET # BLD AUTO: 283 10E9/L (ref 150–450)
POTASSIUM SERPL-SCNC: 3.8 MMOL/L (ref 3.5–5.1)
POTASSIUM SERPL-SCNC: 4.1 MMOL/L (ref 3.5–5.1)
PROT SERPL-MCNC: 5.7 G/DL (ref 6.4–8.9)
PROT SERPL-MCNC: 6.6 G/DL (ref 6.4–8.9)
RBC # BLD AUTO: 5.48 10E12/L (ref 3.8–5.2)
SODIUM SERPL-SCNC: 142 MMOL/L (ref 134–144)
SODIUM SERPL-SCNC: 145 MMOL/L (ref 134–144)
T4 FREE SERPL-MCNC: 4.39 NG/DL (ref 0.6–1.6)
TROPONIN I SERPL-MCNC: 0.16 UG/L (ref 0–0.03)
TSH SERPL DL<=0.05 MIU/L-ACNC: <0.1 IU/ML (ref 0.34–5.6)
WBC # BLD AUTO: 11.7 10E9/L (ref 4–11)

## 2019-01-01 PROCEDURE — 99291 CRITICAL CARE FIRST HOUR: CPT | Mod: 25 | Performed by: FAMILY MEDICINE

## 2019-01-01 PROCEDURE — 84443 ASSAY THYROID STIM HORMONE: CPT | Mod: ZL | Performed by: FAMILY MEDICINE

## 2019-01-01 PROCEDURE — 80053 COMPREHEN METABOLIC PANEL: CPT | Mod: ZL | Performed by: FAMILY MEDICINE

## 2019-01-01 PROCEDURE — 85610 PROTHROMBIN TIME: CPT | Performed by: FAMILY MEDICINE

## 2019-01-01 PROCEDURE — 84439 ASSAY OF FREE THYROXINE: CPT | Mod: ZL | Performed by: FAMILY MEDICINE

## 2019-01-01 PROCEDURE — 99214 OFFICE O/P EST MOD 30 MIN: CPT | Performed by: FAMILY MEDICINE

## 2019-01-01 PROCEDURE — 93005 ELECTROCARDIOGRAM TRACING: CPT | Performed by: FAMILY MEDICINE

## 2019-01-01 PROCEDURE — 84484 ASSAY OF TROPONIN QUANT: CPT | Performed by: FAMILY MEDICINE

## 2019-01-01 PROCEDURE — 25000125 ZZHC RX 250: Performed by: NURSE ANESTHETIST, CERTIFIED REGISTERED

## 2019-01-01 PROCEDURE — 85025 COMPLETE CBC W/AUTO DIFF WBC: CPT | Performed by: FAMILY MEDICINE

## 2019-01-01 PROCEDURE — 96374 THER/PROPH/DIAG INJ IV PUSH: CPT | Mod: XU | Performed by: FAMILY MEDICINE

## 2019-01-01 PROCEDURE — 36410 VNPNXR 3YR/> PHY/QHP DX/THER: CPT | Performed by: NURSE ANESTHETIST, CERTIFIED REGISTERED

## 2019-01-01 PROCEDURE — 40000275 ZZH STATISTIC RCP TIME EA 10 MIN

## 2019-01-01 PROCEDURE — 36415 COLL VENOUS BLD VENIPUNCTURE: CPT | Mod: ZL | Performed by: FAMILY MEDICINE

## 2019-01-01 PROCEDURE — 36415 COLL VENOUS BLD VENIPUNCTURE: CPT | Performed by: FAMILY MEDICINE

## 2019-01-01 PROCEDURE — G0463 HOSPITAL OUTPT CLINIC VISIT: HCPCS

## 2019-01-01 PROCEDURE — 83880 ASSAY OF NATRIURETIC PEPTIDE: CPT | Performed by: FAMILY MEDICINE

## 2019-01-01 PROCEDURE — 99291 CRITICAL CARE FIRST HOUR: CPT | Mod: Z6 | Performed by: FAMILY MEDICINE

## 2019-01-01 PROCEDURE — G0463 HOSPITAL OUTPT CLINIC VISIT: HCPCS | Mod: 25

## 2019-01-01 PROCEDURE — 85730 THROMBOPLASTIN TIME PARTIAL: CPT | Performed by: FAMILY MEDICINE

## 2019-01-01 PROCEDURE — 73610 X-RAY EXAM OF ANKLE: CPT | Mod: LT

## 2019-01-01 PROCEDURE — 25000128 H RX IP 250 OP 636: Performed by: FAMILY MEDICINE

## 2019-01-01 PROCEDURE — 83735 ASSAY OF MAGNESIUM: CPT | Performed by: FAMILY MEDICINE

## 2019-01-01 PROCEDURE — 80053 COMPREHEN METABOLIC PANEL: CPT | Performed by: FAMILY MEDICINE

## 2019-01-01 RX ORDER — LISINOPRIL 5 MG/1
TABLET ORAL
Qty: 90 TABLET | Refills: 0 | OUTPATIENT
Start: 2019-01-01

## 2019-01-01 RX ORDER — NOREPINEPHRINE BITARTRATE 0.06 MG/ML
0.03-0.4 INJECTION, SOLUTION INTRAVENOUS CONTINUOUS
Status: DISCONTINUED | OUTPATIENT
Start: 2019-01-01 | End: 2019-01-01 | Stop reason: HOSPADM

## 2019-01-01 RX ORDER — SODIUM CHLORIDE 9 MG/ML
1000 INJECTION, SOLUTION INTRAVENOUS CONTINUOUS
Status: DISCONTINUED | OUTPATIENT
Start: 2019-01-01 | End: 2019-01-01 | Stop reason: HOSPADM

## 2019-01-01 RX ADMIN — MINERAL OIL AND WHITE PETROLATUM 1 INCH: 150; 830 OINTMENT OPHTHALMIC at 22:21

## 2019-01-01 RX ADMIN — AMIODARONE HYDROCHLORIDE 150 MG: 1.5 INJECTION, SOLUTION INTRAVENOUS at 22:19

## 2019-01-01 RX ADMIN — SODIUM CHLORIDE 1000 ML: 9 INJECTION, SOLUTION INTRAVENOUS at 22:19

## 2019-01-01 ASSESSMENT — MIFFLIN-ST. JEOR
SCORE: 1141.02
SCORE: 1091.13
SCORE: 1085.68

## 2019-01-01 ASSESSMENT — ENCOUNTER SYMPTOMS
FEVER: 0
EYES NEGATIVE: 1
DIZZINESS: 0
PSYCHIATRIC NEGATIVE: 1
JOINT SWELLING: 1
FATIGUE: 1
COUGH: 1
SHORTNESS OF BREATH: 1
ACTIVITY CHANGE: 1
CHILLS: 0

## 2019-01-01 ASSESSMENT — PATIENT HEALTH QUESTIONNAIRE - PHQ9: SUM OF ALL RESPONSES TO PHQ QUESTIONS 1-9: 0

## 2019-01-01 ASSESSMENT — PAIN SCALES - GENERAL
PAINLEVEL: EXTREME PAIN (8)
PAINLEVEL: NO PAIN (0)

## 2019-01-01 NOTE — ED AVS SNAPSHOT
[facility]    [address]    [place] [address]    Phone:  [phone]    Fax:  [phone]                                       Jewels Ferrer   MRN: [id number]    Department:  [facility]   Date of Visit:  4/23/2018           After Visit Summary Signature Page     I have received my discharge instructions, and my questions have been answered. I have discussed any challenges I see with this plan with the nurse or doctor.    ..........................................................................................................................................  Patient/Patient Representative Signature      ..........................................................................................................................................  Patient Representative Print Name and Relationship to Patient    ..................................................               ................................................  Date                                            Time    ..........................................................................................................................................  Reviewed by Signature/Title    ...................................................              ..............................................  Date                                                            Time           11.106

## 2019-04-22 NOTE — TELEPHONE ENCOUNTER
Refill Request for: lisinopril (PRINIVIL/ZESTRIL) 5 MG tablet  Received From: [facility]   Last Written Prescription Date: 09/17/18 for 90 tablets and 3 refills  LOV: 10/12/18 with PCP  Next Appointment: No upcoming office visit on file during initial refill reviewwith PCP  Protocol: ACE Inhibitors (Including Combos) Protocol Failed - 4/22 2:17 PM   Normal serum creatinine on file in past 12 months     Called to speak with [facility]'s pharmacist regarding necessity of new RX given last written RX back in September 2018. Pharmacist stated last written RX is active and on file; will be able to provide medication refill from that RX.     Refill request denied; too soon, last written prescription will provide adequate medication supply.       Huma Mason RN on 4/22/2019 at 2:24 PM

## 2019-06-18 NOTE — TELEPHONE ENCOUNTER
States that they would like to have Pt set up for PT as she keeps falling and seems to be unstable on her feet.

## 2019-06-18 NOTE — TELEPHONE ENCOUNTER
Talked with patient's  and got patient in 6/26/19 at 1:45pm. With PCP.  Samir Moreno LPN ..............6/18/2019 3:02 PM

## 2019-06-26 PROBLEM — R29.898 WEAKNESS OF BOTH LOWER EXTREMITIES: Status: ACTIVE | Noted: 2019-01-01

## 2019-06-26 NOTE — NURSING NOTE
Patient here for referral for PT order for dizzy real easy and she bends over and falls on the floor. Difficulty getting up and down out of chairs. She has difficulty walking a long distance she does have a walker but does not use it.  Medication Reconciliation: complete.    Amira Munguia LPN  6/26/2019 2:01 PM

## 2019-06-27 NOTE — PROGRESS NOTES
"  SUBJECTIVE:   Jewels Ferrer is a 63 year old female who presents to clinic today for the following health issues: Weakness    Patient arrives here for weakness.  She has a long history of weakness but this is progressively gotten worse over the last year or 2.  She states that she is constantly falling.  She is dizzy.  She is even having difficulty when she does fall getting up and and needs assistance.  She feels her strength in her lower extremities are gone.  She is requesting physical therapy.  She also reports that time she is \"weak all over\".        Patient Active Problem List    Diagnosis Date Noted     Weakness of both lower extremities 06/26/2019     Priority: Medium     Superficial gastritis without hemorrhage 10/12/2018     Priority: Medium     Blood glucose elevated 10/12/2018     Priority: Medium     Hypercholesterolemia 01/30/2018     Priority: Medium     S/P laparoscopic cholecystectomy 01/02/2018     Priority: Medium     Acute respiratory failure with hypoxia and hypercarbia (H) 12/11/2017     Priority: Medium     RBBB (right bundle branch block) 12/08/2017     Priority: Medium     Diastolic heart failure (H) 07/27/2017     Priority: Medium     Overview:   normal LVEF s/p TTE 8/21/2017; Dr Palacios, [facility] last seen 8/25/2017       Paroxysmal atrial fibrillation (H) 07/27/2017     Priority: Medium     Overview:   chronic xarelto       Non-rheumatic mitral regurgitation 06/22/2017     Priority: Medium     Overview:   mild to moderate s/p TTE 8/21/2017       Severe obstructive sleep apnea 01/01/2015     Priority: Medium     Overview:   doesn't use CPAP       S/P arthroscopic knee surgery 09/08/2014     Priority: Medium     Left knee DJD 07/31/2014     Priority: Medium     Esophageal reflux 06/06/2012     Priority: Medium     Hypertension, benign 09/07/2011     Priority: Medium     Past Medical History:   Diagnosis Date     Acquired absence of other specified parts of digestive tract     " 1/2/2018     Calculus of gallbladder without cholecystitis without obstruction     2017,US done in [place]     Chest pain     8/3/2004,normal sestamibi cardiac scan     Diastolic congestive heart failure (H)     07/27/2017,normal LVEF s/p TTE 8/21/2017; Dr Palacios, [facility] last seen 8/25/2017     Essential (primary) hypertension     9/7/2011     Headache     6/6/2012     Nonrheumatic mitral valve insufficiency     06/22/2017,mild to moderate s/p TTE 8/21/2017     Obstructive sleep apnea     2015,doesn't use CPAP     Other internal derangements of unspecified knee     8/22/2014     Other specified postprocedural states     9/8/2014     Other tear of medial meniscus, current injury, unspecified knee, initial encounter     8/8/2014     Paroxysmal atrial fibrillation (H)     07/27/2017,chronic xarelto     Urinary tract infection     No Comments Provided      Past Surgical History:   Procedure Laterality Date     APPENDECTOMY OPEN      2/1995     ARTHROSCOPY KNEE      8/23/2007, left knee by Dr. Garcia     COLONOSCOPY      4/2006,Normal screening. follow-up recommended in 10 years     DILATION AND CURETTAGE      for endometritis     OTHER SURGICAL HISTORY      [id number],OTHER,surgery     OTHER SURGICAL HISTORY      9/8/2014,[id number],OTHER,Left     Social History     Social History Narrative    .  3 boys.  paper route     Current Outpatient Medications   Medication Sig Dispense Refill     acetaminophen (TYLENOL) 500 MG tablet Take 500-1,000 mg by mouth every 6 hours as needed Max acetaminophen dose 4000mg in 24 hrs       atorvastatin (LIPITOR) 10 MG tablet Take 1 tablet (10 mg) by mouth At Bedtime 90 tablet 3     furosemide (LASIX) 20 MG tablet Take 1 tablet (20 mg) by mouth every morning 90 tablet 3     lisinopril (PRINIVIL/ZESTRIL) 5 MG tablet Take 1 tablet (5 mg) by mouth daily 90 tablet 3     metoprolol tartrate (LOPRESSOR) 25 MG tablet Take 1 tablet (25 mg) by mouth 2 times daily 180 tablet 3     omeprazole  "(PRILOSEC) 40 MG capsule Take 1 capsule (40 mg) by mouth daily Take 30-60 minutes before a meal. 90 capsule 2     rivaroxaban ANTICOAGULANT (XARELTO) 20 MG TABS tablet Take 1 tablet (20 mg) by mouth daily (with dinner) 90 tablet 3     Allergies   Allergen Reactions     Meclofenamate Unknown     Naproxen Unknown and Nausea and Vomiting     Sulfa Drugs Rash     vomiting       Review of Systems     OBJECTIVE:     /62   Pulse 80   Temp 96.1  F (35.6  C)   Resp 20   Ht 1.6 m (5' 3\")   Wt 61.7 kg (136 lb)   BMI 24.09 kg/m    Body mass index is 24.09 kg/m .  Physical Exam   Constitutional: She appears well-developed.   She has trouble keeping her upper lids open   HENT:   Appears to have slight amount of facial asymmetry   Cardiovascular: Normal rate and regular rhythm.   Pulmonary/Chest: Effort normal and breath sounds normal. No respiratory distress.   Abdominal: Soft.   Neurological: She is alert.   I cannot elicit any deep tendon reflexes on lower extremities.  She has no clonus or any beats whatsoever patient is very unsteady when standing.  She has difficulty getting out of the chair.  There appears to be somewhat atrophy involving the extremities.   Skin: Skin is warm.           ASSESSMENT/PLAN:         1. Weakness of both lower extremities  Physical therapy.  Also concerned about other neurologic problem.  Will refer to neurology.  - PHYSICAL THERAPY REFERRAL; Future  - NEUROLOGY ADULT REFERRAL      Brock Browne MD  [facility]  "

## 2019-08-07 NOTE — PROGRESS NOTES
"  SUBJECTIVE:   Jewels Ferrer is a 63 year old female who presents to clinic today for the following health issues: Follow-up neurology appointment    Patient arrives here for follow-up neurology appointment.  She is with her son.  She was seen by neurology July 18.  EMG was done.  States that the neurologist walked in and indicated he knows what she has without looking at her.  Stated that she has \"some muscle disease\".  She cannot be more specific than that.  She continues to be weak.  Her son reports there is nothing that can be done.  Also indicated that the neurologist wanted DNA testing.  I do not have any reports available for review.  Patient does also have a history of hypertrophic cardiomyopathy.  She is on Lasix and wants to stop because of urinating.  She has not had an ultrasound recently.  Short of breath with activity.  And she recently fell and twisted her left ankle.        Patient Active Problem List    Diagnosis Date Noted     Weakness of both lower extremities 06/26/2019     Priority: Medium     Superficial gastritis without hemorrhage 10/12/2018     Priority: Medium     Blood glucose elevated 10/12/2018     Priority: Medium     Hypercholesterolemia 01/30/2018     Priority: Medium     S/P laparoscopic cholecystectomy 01/02/2018     Priority: Medium     Acute respiratory failure with hypoxia and hypercarbia (H) 12/11/2017     Priority: Medium     RBBB (right bundle branch block) 12/08/2017     Priority: Medium     Diastolic heart failure (H) 07/27/2017     Priority: Medium     Overview:   normal LVEF s/p TTE 8/21/2017; Dr Palacios, [facility] last seen 8/25/2017       Paroxysmal atrial fibrillation (H) 07/27/2017     Priority: Medium     Overview:   chronic xarelto       Non-rheumatic mitral regurgitation 06/22/2017     Priority: Medium     Overview:   mild to moderate s/p TTE 8/21/2017       Severe obstructive sleep apnea 01/01/2015     Priority: Medium     Overview:   doesn't use CPAP       " S/P arthroscopic knee surgery 09/08/2014     Priority: Medium     Left knee DJD 07/31/2014     Priority: Medium     Esophageal reflux 06/06/2012     Priority: Medium     Hypertension, benign 09/07/2011     Priority: Medium     Past Medical History:   Diagnosis Date     Acquired absence of other specified parts of digestive tract     1/2/2018     Calculus of gallbladder without cholecystitis without obstruction     2017,US done in [place]     Chest pain     8/3/2004,normal sestamibi cardiac scan     Diastolic congestive heart failure (H)     07/27/2017,normal LVEF s/p TTE 8/21/2017; Dr Palacios, [facility] last seen 8/25/2017     Essential (primary) hypertension     9/7/2011     Headache     6/6/2012     Nonrheumatic mitral valve insufficiency     06/22/2017,mild to moderate s/p TTE 8/21/2017     Obstructive sleep apnea     2015,doesn't use CPAP     Other internal derangements of unspecified knee     8/22/2014     Other specified postprocedural states     9/8/2014     Other tear of medial meniscus, current injury, unspecified knee, initial encounter     8/8/2014     Paroxysmal atrial fibrillation (H)     07/27/2017,chronic xarelto     Urinary tract infection     No Comments Provided      Past Surgical History:   Procedure Laterality Date     APPENDECTOMY OPEN      2/1995     ARTHROSCOPY KNEE      8/23/2007, left knee by Dr. Garcia     COLONOSCOPY      4/2006,Normal screening. follow-up recommended in 10 years     DILATION AND CURETTAGE      for endometritis     OTHER SURGICAL HISTORY      [id number],OTHER,surgery     OTHER SURGICAL HISTORY      9/8/2014,[id number],OTHER,Left     Social History     Social History Narrative    .  3 boys.  paper route     Current Outpatient Medications   Medication Sig Dispense Refill     acetaminophen (TYLENOL) 500 MG tablet Take 500-1,000 mg by mouth every 6 hours as needed Max acetaminophen dose 4000mg in 24 hrs       atorvastatin (LIPITOR) 10 MG tablet Take 1 tablet (10 mg) by  "mouth At Bedtime 90 tablet 3     furosemide (LASIX) 20 MG tablet Take 1 tablet (20 mg) by mouth every morning 90 tablet 3     lisinopril (PRINIVIL/ZESTRIL) 5 MG tablet Take 1 tablet (5 mg) by mouth daily 90 tablet 3     metoprolol tartrate (LOPRESSOR) 25 MG tablet Take 1 tablet (25 mg) by mouth 2 times daily 180 tablet 3     omeprazole (PRILOSEC) 40 MG capsule Take 1 capsule (40 mg) by mouth daily Take 30-60 minutes before a meal. 90 capsule 2     rivaroxaban ANTICOAGULANT (XARELTO) 20 MG TABS tablet Take 1 tablet (20 mg) by mouth daily (with dinner) 90 tablet 3     Allergies   Allergen Reactions     Meclofenamate Unknown     Naproxen Unknown and Nausea and Vomiting     Sulfa Drugs Rash     vomiting       Review of Systems   Constitutional: Positive for activity change and fatigue. Negative for chills and fever.   Eyes: Negative.    Respiratory: Positive for cough and shortness of breath.    Cardiovascular: Negative for chest pain.   Genitourinary: Negative.    Musculoskeletal: Positive for joint swelling.   Neurological: Negative for dizziness.   Psychiatric/Behavioral: Negative.         OBJECTIVE:     BP 92/56 (BP Location: Left arm)   Pulse 88   Temp 97.3  F (36.3  C)   Resp 24   Ht 1.6 m (5' 3\")   Wt 56.2 kg (123 lb 12.8 oz)   BMI 21.93 kg/m    Body mass index is 21.93 kg/m .  Physical Exam   Constitutional:   Patient is in a wheelchair.  Barely keep her upper lids open.  Appears to be very deconditioned.   Cardiovascular: Normal rate and regular rhythm.   Pulmonary/Chest: Effort normal and breath sounds normal.   Abdominal: Soft.   Musculoskeletal:   Left ankle is bruised.  She has pain along the lateral malleus along with the first digit.  With palpation   Neurological: She is alert.   Psychiatric:   Speech is slightly slurred       Diagnostic Test Results:  Results for orders placed or performed in visit on 08/07/19   Comprehensive Metabolic Panel   Result Value Ref Range    Sodium 142 134 - 144 mmol/L "    Potassium 3.8 3.5 - 5.1 mmol/L    Chloride 102 98 - 107 mmol/L    Carbon Dioxide 32 (H) 21 - 31 mmol/L    Anion Gap 8 3 - 14 mmol/L    Glucose 134 (H) 70 - 105 mg/dL    Urea Nitrogen 15 7 - 25 mg/dL    Creatinine 0.36 (L) 0.60 - 1.20 mg/dL    GFR Estimate >90 >60 mL/min/[1.73_m2]    GFR Estimate If Black >90 >60 mL/min/[1.73_m2]    Calcium 10.4 (H) 8.6 - 10.3 mg/dL    Bilirubin Total 0.8 0.3 - 1.0 mg/dL    Albumin 3.8 3.5 - 5.7 g/dL    Protein Total 6.6 6.4 - 8.9 g/dL    Alkaline Phosphatase 84 34 - 104 U/L    ALT 22 7 - 52 U/L    AST 27 13 - 39 U/L   TSH Reflex GH   Result Value Ref Range    TSH Reflex <0.10 (L) 0.34 - 5.60 IU/mL   T4 free   Result Value Ref Range    T4 Free 4.39 (H) 0.60 - 1.60 ng/dL       ASSESSMENT/PLAN:         1. Diastolic heart failure, unspecified HF chronicity (H)  Proceed with an echocardiogram.  I advised patient continue the Lasix for now.  - Echocardiogram Complete; Future  - T4 free  - T4 free    2. Sprain of left ankle, unspecified ligament, initial encounter  X-rays were negative for fracture.  - XR Ankle Left G/E 3 Views; Future    3. Weakness of both lower extremities  Obtain the neurology and the EMG results.  I suspect her diagnosis will be in the consultation or EMG.  - CBC W PLT No Diff; Future  - TSH Reflex GH; Future  - Comprehensive Metabolic Panel; Future  - Comprehensive Metabolic Panel  - TSH Reflex GH    TSH is low but the T4 elevated.  We will proceed with endocrinology consultation.    Brock Browne MD  [facility] AND \[facility] [place] [facility]\""betty kennedy refill so her neurologist is

## 2019-08-07 NOTE — NURSING NOTE
Patient here for left ankle pain after a fall 1 week ago, she has had a cough for 2 weeks. She would like off the lasix medication.Medication Reconciliation: complete.    Amira Munguia LPN  8/7/2019 3:41 PM

## 2019-08-11 NOTE — ED PROVIDER NOTES
History     Chief Complaint   Patient presents with     Cardiac Arrest     HPI  Jewels Ferrer is a 63 year old female who is brought into the emergency room from home via ambulance for respiratory and cardiac arrest.  Patient was found by a family member to be lying on the floor with shallow breathing.  911 was called and first responders report that the patient was initially awake but then became unresponsive and pulseless.  CPR was performed for 2 minutes with 1 round of epi and the patient had spontaneous return of circulation. An Igel was placed to secure her airway.  Patient was given ketamine as she was starting to fight the tube.  She is anticoagulated on Xarelto.    Patient remains unresponsive and the cardiac monitor shows atrial fibrillation with RVR.    Allergies:  Allergies   Allergen Reactions     Meclofenamate Unknown     Naproxen Unknown and Nausea and Vomiting     Sulfa Drugs Rash     vomiting       Problem List:    Patient Active Problem List    Diagnosis Date Noted     Weakness of both lower extremities 06/26/2019     Priority: Medium     Superficial gastritis without hemorrhage 10/12/2018     Priority: Medium     Blood glucose elevated 10/12/2018     Priority: Medium     Hypercholesterolemia 01/30/2018     Priority: Medium     S/P laparoscopic cholecystectomy 01/02/2018     Priority: Medium     Acute respiratory failure with hypoxia and hypercarbia (H) 12/11/2017     Priority: Medium     RBBB (right bundle branch block) 12/08/2017     Priority: Medium     Diastolic heart failure (H) 07/27/2017     Priority: Medium     Overview:   normal LVEF s/p TTE 8/21/2017; Dr Palacios, [facility] last seen 8/25/2017       Paroxysmal atrial fibrillation (H) 07/27/2017     Priority: Medium     Overview:   chronic xarelto       Non-rheumatic mitral regurgitation 06/22/2017     Priority: Medium     Overview:   mild to moderate s/p TTE 8/21/2017       Severe obstructive sleep apnea 01/01/2015     Priority:  "Medium     Overview:   doesn't use CPAP       S/P arthroscopic knee surgery 09/08/2014     Priority: Medium     Left knee DJD 07/31/2014     Priority: Medium     Esophageal reflux 06/06/2012     Priority: Medium     Hypertension, benign 09/07/2011     Priority: Medium        Past Medical History:    Past Medical History:   Diagnosis Date     Acquired absence of other specified parts of digestive tract      Calculus of gallbladder without cholecystitis without obstruction      Chest pain      Diastolic congestive heart failure (H)      Essential (primary) hypertension      Headache      Nonrheumatic mitral valve insufficiency      Obstructive sleep apnea      Other internal derangements of unspecified knee      Other specified postprocedural states      Other tear of medial meniscus, current injury, unspecified knee, initial encounter      Paroxysmal atrial fibrillation (H)      Urinary tract infection        Past Surgical History:    Past Surgical History:   Procedure Laterality Date     APPENDECTOMY OPEN      2/1995     ARTHROSCOPY KNEE      8/23/2007, left knee by Dr. Garcia     COLONOSCOPY      4/2006,Normal screening. follow-up recommended in 10 years     DILATION AND CURETTAGE      for endometritis     OTHER SURGICAL HISTORY      [id number],OTHER,surgery     OTHER SURGICAL HISTORY      9/8/2014,[id number],OTHER,Left       Family History:    Family History   Problem Relation Age of Onset     Other - See Comments Mother         Bronchiectasis/Chronic headaches     Hypertension Father         Hypertension     Diabetes Father         Diabetes     Heart Disease Father         Heart Disease,MI     Other - See Comments Sister         Chronic headaches       Social History:  Marital Status:   [2]  Social History     Tobacco Use     Smoking status: Never Smoker     Smokeless tobacco: Never Used   Substance Use Topics     Alcohol use: Yes     Comment: Alcoholic Drinks/day: \"glass of wine once or twice a year\"     Drug " "use: No     Comment: Drug use: No        Medications:      acetaminophen (TYLENOL) 500 MG tablet   atorvastatin (LIPITOR) 10 MG tablet   furosemide (LASIX) 20 MG tablet   lisinopril (PRINIVIL/ZESTRIL) 5 MG tablet   metoprolol tartrate (LOPRESSOR) 25 MG tablet   omeprazole (PRILOSEC) 40 MG capsule   rivaroxaban ANTICOAGULANT (XARELTO) 20 MG TABS tablet         Review of Systems   Unable to perform ROS: Patient unresponsive       Physical Exam   BP: (!) 88/75  Pulse: 68  Heart Rate: 176  Temp: 97.2  F (36.2  C)  Resp: 10  Height: 160 cm (5' 3\")  Weight: 56.7 kg (125 lb)  SpO2: (!) 79 %      Physical Exam   Constitutional: She has a sickly appearance. Backboard in place.   HENT:   Head: Normocephalic and atraumatic.   Mouth/Throat: Oropharynx is clear and moist and mucous membranes are normal.   Eyes: Right pupil is not reactive. Left pupil is not reactive.   Neck: Trachea normal. Neck supple.   Cardiovascular: S1 normal, S2 normal, normal heart sounds and intact distal pulses. An irregularly irregular rhythm present. Tachycardia present.   Pulmonary/Chest: She has decreased breath sounds. She has rales.   Patient has equal bilateral breath sounds with loud crackles bilaterally.  Diminished breath sounds at the bases.  The patient is being bagged.   Abdominal: Soft. Bowel sounds are decreased.   Neurological: She is unresponsive.   Skin: Skin is warm. Capillary refill takes less than 2 seconds. Bruising noted. She is not diaphoretic.            ED Course     Procedures         EKG Interpretation:      Interpreted by Rony Gann MD  Time reviewed: 2215  Symptoms at time of EKG: S/P cardiac arrest   Rhythm: sinus tachycardia  Rate: 140-150  Axis: Normal  Ectopy: none  Conduction: left anterior fasciclar block  ST Segments/ T Waves: ST Segment Depression V2, V3, V4, V5 and V6  Q Waves: none  Comparison to prior: EKG shows sinus tachycardia with occasional PVCs, left anterior fascicular block, LVH with QRS " widening    Clinical Impression: sinus tachycardia left anterior fascicular block, LVH with QRS widening    Critical Care time:  was 42 minutes for this patient excluding procedures.    Results for orders placed or performed during the hospital encounter of 08/10/19 (from the past 24 hour(s))   CBC with platelets differential   Result Value Ref Range    WBC 11.7 (H) 4.0 - 11.0 10e9/L    RBC Count 5.48 (H) 3.8 - 5.2 10e12/L    Hemoglobin 14.8 11.7 - 15.7 g/dL    Hematocrit 47.7 (H) 35.0 - 47.0 %    MCV 87 78 - 100 fl    MCH 27.0 26.5 - 33.0 pg    MCHC 31.0 (L) 31.5 - 36.5 g/dL    RDW 15.8 (H) 10.0 - 15.0 %    Platelet Count 283 150 - 450 10e9/L    Diff Method Automated Method     % Neutrophils 57.0 %    % Lymphocytes 29.6 %    % Monocytes 10.7 %    % Eosinophils 0.3 %    % Basophils 0.3 %    % Immature Granulocytes 2.1 %    Absolute Neutrophil 6.7 1.6 - 8.3 10e9/L    Absolute Lymphocytes 3.5 0.8 - 5.3 10e9/L    Absolute Monocytes 1.3 0.0 - 1.3 10e9/L    Absolute Eosinophils 0.0 0.0 - 0.7 10e9/L    Absolute Basophils 0.0 0.0 - 0.2 10e9/L    Abs Immature Granulocytes 0.2 0 - 0.4 10e9/L   Troponin I   Result Value Ref Range    Troponin I ES 0.159 (H) 0.000 - 0.034 ug/L   INR   Result Value Ref Range    INR 1.14 0 - 1.3   Partial thromboplastin time   Result Value Ref Range    PTT 32 22 - 37 sec   Comprehensive metabolic panel   Result Value Ref Range    Sodium 145 (H) 134 - 144 mmol/L    Potassium 4.1 3.5 - 5.1 mmol/L    Chloride 107 98 - 107 mmol/L    Carbon Dioxide 22 21 - 31 mmol/L    Anion Gap 16 (H) 3 - 14 mmol/L    Glucose 122 (H) 70 - 105 mg/dL    Urea Nitrogen 19 7 - 25 mg/dL    Creatinine 0.51 (L) 0.60 - 1.20 mg/dL    GFR Estimate >90 >60 mL/min/[1.73_m2]    GFR Estimate If Black >90 >60 mL/min/[1.73_m2]    Calcium 9.6 8.6 - 10.3 mg/dL    Bilirubin Total 0.8 0.3 - 1.0 mg/dL    Albumin 3.3 (L) 3.5 - 5.7 g/dL    Protein Total 5.7 (L) 6.4 - 8.9 g/dL    Alkaline Phosphatase 89 34 - 104 U/L    ALT 40 7 - 52 U/L     AST 80 (H) 13 - 39 U/L   Magnesium   Result Value Ref Range    Magnesium 2.3 1.9 - 2.7 mg/dL   Nt probnp inpatient (BNP)   Result Value Ref Range    N-Terminal Pro BNP Inpatient 379 (H) 0 - 100 pg/mL       Medications   amiodarone (NEXTERONE) bolus 150 mg (0 mg Intravenous ED Infusing on Admission/transfer 8/10/19 2227)   0.9% sodium chloride BOLUS (0 mLs Intravenous ED Infusing on Admission/transfer 8/10/19 2238)       Assessments & Plan (with Medical Decision Making)     I have reviewed the nursing notes.    Anesthesia was presents and intubated the patient successfully.    The patient had atrial fibrillation with RVR with heart rate ranging from 188 into the low 200s and therefore was shocked twice.  First time at 200 J and second time at 300 J.  This improved her heart rate and blood pressure and she was then in sinus tachycardia.  She was given amiodarone 150 mg IV.  The patient's blood pressure had improved to the 120s over 60s.    2221 - The patient is discussed with and very kindly accepted by Dr Spears at [facility] in [place].  He would like patient started on Levophed drip and he has requested an ABG.  He requested that the patient be ventilated at 20 breaths/min and 8 mils per kilogram.  This information was relayed to the flight crew.    Discharge Medication List as of 8/10/2019 10:54 PM          Final diagnoses:   Cardiopulmonary arrest with successful resuscitation (H)   Atrial fibrillation with rapid ventricular response (H)       8/10/2019   [facility] AND [facility]     Rony Gann MD  08/11/19 0616

## 2019-08-11 NOTE — ED TRIAGE NOTES
EMS Arrival Note  ________________________________  Jewels Ferrer is a 63 year old Female that arrives via [facility] ambulance service [facility]  Pre hospital clinical presentation per EMS personnel includes EMS called for pt SOB, when PD arrived pt unresponsive with shallow breathing, EMS arrived and pt went unresponsive and pulseless, 2 minutes of CPR performed and ROSC achieved, pt presently has an I-gel in place lung sounds wet per EMS, did receive one round of epi.  Pre hospital personnel report vital signs of:  B/P 99/58; , RR 10;SpO2 94  Pre Hospital Cardiac rhythm reported as A fib  Pre hospital care included: Medication: Ketamine and Epi:, Spinal Precautions:Long spine board, Respiratory Support: Igel, Orthopedic Support  CPRLucas  and has ROSC  Patient arrives with:  GCS unresponsive  Airway intact  Breathing Assessment via BMV.    Circulation Assessment Abnormal.  Patient arrives with a 18 gauge IV at her right anticubital with 500 ml of normal saline infused upon arrival.    Placed in room 902, gowned, warm blanket provided, side rails up,  ID verified and band placed, and call light within reach.       Previous living situation Spouse

## 2019-08-11 NOTE — ED NOTES
[facility] arrived, report given to them by CRNA and writer, [facility] comfortable without pt having CXR to confirm tube placement.

## 2019-08-11 NOTE — ED NOTES
See code blue sheet for further information.  Pt left  with [facility] staff, will transfer to [facility].

## (undated) RX ORDER — ACETAMINOPHEN 325 MG/1
TABLET ORAL
Status: DISPENSED
Start: 2018-04-23

## (undated) RX ORDER — SODIUM CHLORIDE 9 MG/ML
INJECTION, SOLUTION INTRAVENOUS
Status: DISPENSED
Start: 2019-01-01